# Patient Record
Sex: FEMALE | Race: BLACK OR AFRICAN AMERICAN | Employment: FULL TIME | URBAN - METROPOLITAN AREA
[De-identification: names, ages, dates, MRNs, and addresses within clinical notes are randomized per-mention and may not be internally consistent; named-entity substitution may affect disease eponyms.]

---

## 2017-01-02 ENCOUNTER — OP HISTORICAL/CONVERTED ENCOUNTER (OUTPATIENT)
Dept: OTHER | Age: 22
End: 2017-01-02

## 2017-02-13 ENCOUNTER — IP HISTORICAL/CONVERTED ENCOUNTER (OUTPATIENT)
Dept: OTHER | Age: 22
End: 2017-02-13

## 2019-10-24 LAB — PAP SMEAR, EXTERNAL: NEGATIVE

## 2020-10-07 ENCOUNTER — OFFICE VISIT (OUTPATIENT)
Dept: ENT CLINIC | Age: 25
End: 2020-10-07
Payer: MEDICAID

## 2020-10-07 VITALS
RESPIRATION RATE: 18 BRPM | HEART RATE: 82 BPM | DIASTOLIC BLOOD PRESSURE: 72 MMHG | OXYGEN SATURATION: 98 % | WEIGHT: 136 LBS | HEIGHT: 64 IN | BODY MASS INDEX: 23.22 KG/M2 | SYSTOLIC BLOOD PRESSURE: 110 MMHG | TEMPERATURE: 98.6 F

## 2020-10-07 DIAGNOSIS — J31.0 CHRONIC RHINITIS: ICD-10-CM

## 2020-10-07 DIAGNOSIS — R49.0 DYSPHONIA: ICD-10-CM

## 2020-10-07 DIAGNOSIS — J37.0 CHRONIC LARYNGITIS: Primary | ICD-10-CM

## 2020-10-07 PROCEDURE — 99204 OFFICE O/P NEW MOD 45 MIN: CPT | Performed by: OTOLARYNGOLOGY

## 2020-10-07 PROCEDURE — 31575 DIAGNOSTIC LARYNGOSCOPY: CPT | Performed by: OTOLARYNGOLOGY

## 2020-10-07 RX ORDER — MONTELUKAST SODIUM 10 MG/1
10 TABLET ORAL DAILY
Qty: 30 TAB | Refills: 3 | Status: SHIPPED | OUTPATIENT
Start: 2020-10-07 | End: 2020-11-18

## 2020-10-07 RX ORDER — FLUTICASONE PROPIONATE 50 MCG
2 SPRAY, SUSPENSION (ML) NASAL DAILY
Qty: 1 BOTTLE | Refills: 3 | Status: SHIPPED | OUTPATIENT
Start: 2020-10-07 | End: 2020-11-18

## 2020-10-07 NOTE — PROGRESS NOTES
Subjective:    Jonas Forte   25 y.o.   1995     HPI     Location - throat,, voice    Quality - hoarseness, dry mouth, throat    Severity -  Moderate/severe    Duration - approx 6 mos    Timing - chronic, ongoing    Context - pt with approx 6 mos of hoarseness, dry mouth/throat, itchy throat, some allergy congestion but no drainage; she does have to speak loudly @ work @ [employer]    Modifying Features - worse after work    Associated symptoms/signs - migraines      Review of Systems  Review of Systems   Constitutional: Negative for chills and fever. HENT: Positive for sore throat and tinnitus. Negative for ear pain, hearing loss and nosebleeds. Eyes: Negative for blurred vision and double vision. Respiratory: Positive for sputum production. Negative for cough and shortness of breath. Cardiovascular: Negative for chest pain and palpitations. Gastrointestinal: Negative for heartburn, nausea and vomiting. Musculoskeletal: Negative for joint pain and neck pain. Skin: Positive for itching. Neurological: Positive for headaches. Negative for dizziness, speech change and weakness. Endo/Heme/Allergies: Positive for environmental allergies. Does not bruise/bleed easily. Psychiatric/Behavioral: Negative for memory loss. The patient does not have insomnia. Past Medical History:   Diagnosis Date    Migraines      Past Surgical History:   Procedure Laterality Date    HX TONSILLECTOMY        History reviewed. No pertinent family history. Social History     Tobacco Use    Smoking status: Never Smoker    Smokeless tobacco: Never Used   Substance Use Topics    Alcohol use: Not on file      Prior to Admission medications    Medication Sig Start Date End Date Taking? Authorizing Provider   fluticasone propionate (FLONASE) 50 mcg/actuation nasal spray 2 Sprays by Both Nostrils route daily. 10/7/20  Yes Ian Reardon MD   montelukast (SINGULAIR) 10 mg tablet Take 1 Tab by mouth daily. 10/7/20  Yes Gray Jurado MD        No Known Allergies      Objective:     Visit Vitals  /72 (BP 1 Location: Left arm, BP Patient Position: Sitting)   Pulse 82   Temp 98.6 °F (37 °C) (Oral)   Resp 18   Ht 5' 4\" (1.626 m)   Wt 136 lb (61.7 kg)   SpO2 98%   BMI 23.34 kg/m²        Physical Exam  Vitals signs reviewed. Constitutional:       General: She is awake. She is not in acute distress. Appearance: Normal appearance. She is well-groomed and normal weight. HENT:      Head: Normocephalic and atraumatic. Jaw: There is normal jaw occlusion. No trismus, tenderness or malocclusion. Salivary Glands: Right salivary gland is not diffusely enlarged or tender. Left salivary gland is not diffusely enlarged or tender. Right Ear: Hearing, tympanic membrane, ear canal and external ear normal.      Left Ear: Hearing, tympanic membrane, ear canal and external ear normal.      Ears:      Arora exam findings: does not lateralize. Right Rinne: AC > BC. Left Rinne: AC > BC. Nose: No nasal deformity, septal deviation or mucosal edema. Right Nostril: No epistaxis. Left Nostril: No epistaxis. Right Turbinates: Not enlarged, swollen or pale. Left Turbinates: Not enlarged, swollen or pale. Right Sinus: No maxillary sinus tenderness or frontal sinus tenderness. Left Sinus: No maxillary sinus tenderness or frontal sinus tenderness. Mouth/Throat:      Lips: Pink. No lesions. Mouth: Mucous membranes are moist. No oral lesions. Dentition: Normal dentition. No dental caries. Tongue: No lesions. Palate: No mass and lesions. Pharynx: Oropharynx is clear. Uvula midline. No oropharyngeal exudate or posterior oropharyngeal erythema. Tonsils: No tonsillar exudate. 0 on the right. 0 on the left. Eyes:      General: Vision grossly intact. Extraocular Movements: Extraocular movements intact. Right eye: No nystagmus.       Left eye: No nystagmus. Conjunctiva/sclera: Conjunctivae normal.      Pupils: Pupils are equal, round, and reactive to light. Neck:      Musculoskeletal: No edema or erythema. Thyroid: No thyroid mass, thyromegaly or thyroid tenderness. Trachea: Trachea and phonation normal. No tracheal tenderness or tracheal deviation. Cardiovascular:      Rate and Rhythm: Normal rate and regular rhythm. Pulmonary:      Effort: Pulmonary effort is normal. No respiratory distress. Breath sounds: No stridor. Musculoskeletal: Normal range of motion. General: No swelling or tenderness. Lymphadenopathy:      Cervical: No cervical adenopathy. Skin:     General: Skin is warm and dry. Findings: No lesion or rash. Neurological:      General: No focal deficit present. Mental Status: She is alert and oriented to person, place, and time. Mental status is at baseline. Cranial Nerves: Cranial nerves are intact. Coordination: Romberg sign negative. Gait: Gait is intact. Comments: Negative Hallpike   Psychiatric:         Mood and Affect: Mood normal.         Behavior: Behavior normal. Behavior is cooperative. Procedure Note - Fiberoptic Laryngoscopy    The nostril is packed with lidocaine/oxymetazoline cottonball for several minutes for topical anesthesia. After several minutes the packing is removed and fiberoptic scope is advanced. Findings are as summarized. Nose - normal turbinates, septum  Nasopharynx - scar band from right torus tubarius to adenoid fossa  Oropharynx - normal tonsils, tongue base and posterior wall  Hypopharynx - normal pyriform sinus and post-cricoid region  Larynx - mild VF edema, thickened mucus along medial surface, mild nodularity  Subglottis - visualized upper trachea is normal    Assessment/Plan:     Encounter Diagnoses   Name Primary?     Chronic laryngitis Yes    Chronic rhinitis     Dysphonia      Pt with chronic laryngitis and mild nodularity secondary to voice use, allergy  Stressed importance of vocal hygiene, hydration, decrease voice overuse and take her allergy medication  She understands and will fu 2 mos    Orders Placed This Encounter    LARYNGOSCOPY,FLEX FIBER,DIAGNOSTIC    fluticasone propionate (FLONASE) 50 mcg/actuation nasal spray    montelukast (SINGULAIR) 10 mg tablet     Follow-up and Dispositions    · Return in about 2 months (around 12/7/2020).

## 2020-10-07 NOTE — LETTER
10/7/20 Patient: Skyler Miranda YOB: 1995 Date of Visit: 10/7/2020 Kaylee Issa PA-C 
[address] [place] [address] VIA Facsimile: [phone] Dear Kaylee Issa PA-C, Thank you for referring Ms. Skyler Miranda to [address] [place] [address], NOSE, THROAT AND ALLERGY [facility] for evaluation. My notes for this consultation are attached. If you have questions, please do not hesitate to call me. I look forward to following your patient along with you. Sincerely, Lilia Najera MD

## 2020-10-08 ENCOUNTER — TELEPHONE (OUTPATIENT)
Dept: ENT CLINIC | Age: 25
End: 2020-10-08

## 2020-10-26 VITALS
WEIGHT: 135 LBS | BODY MASS INDEX: 23.05 KG/M2 | HEIGHT: 64 IN | DIASTOLIC BLOOD PRESSURE: 78 MMHG | SYSTOLIC BLOOD PRESSURE: 118 MMHG

## 2020-10-26 PROBLEM — O03.9 MISCARRIAGE: Status: ACTIVE | Noted: 2020-10-26

## 2020-10-26 PROBLEM — G40.909 SEIZURE DISORDER (HCC): Status: ACTIVE | Noted: 2020-10-26

## 2020-10-26 PROBLEM — Z83.3 FAMILY HISTORY OF DIABETES MELLITUS: Status: ACTIVE | Noted: 2020-10-26

## 2020-10-26 PROBLEM — N91.2 AMENORRHEA: Status: ACTIVE | Noted: 2020-10-26

## 2020-10-26 RX ORDER — LORATADINE 10 MG/1
10 TABLET ORAL
COMMUNITY
End: 2022-01-04

## 2020-10-26 RX ORDER — PHENAZOPYRIDINE HYDROCHLORIDE 200 MG/1
TABLET, FILM COATED ORAL
COMMUNITY
End: 2022-01-04

## 2020-10-26 RX ORDER — CIPROFLOXACIN 500 MG/1
TABLET ORAL 2 TIMES DAILY
COMMUNITY
End: 2022-01-04

## 2020-10-26 RX ORDER — IBUPROFEN 800 MG/1
TABLET ORAL
COMMUNITY
End: 2022-01-04

## 2020-10-26 RX ORDER — MEDROXYPROGESTERONE ACETATE 150 MG/ML
150 INJECTION, SUSPENSION INTRAMUSCULAR ONCE
COMMUNITY
End: 2020-11-17 | Stop reason: SDUPTHER

## 2020-10-26 RX ORDER — FLUCONAZOLE 150 MG/1
150 TABLET ORAL DAILY
COMMUNITY
End: 2022-01-04

## 2020-10-26 RX ORDER — AZITHROMYCIN 250 MG/1
250 TABLET, FILM COATED ORAL DAILY
COMMUNITY
End: 2022-01-04

## 2020-10-27 ENCOUNTER — OFFICE VISIT (OUTPATIENT)
Dept: OBGYN CLINIC | Age: 25
End: 2020-10-27
Payer: MEDICAID

## 2020-10-27 VITALS — WEIGHT: 134 LBS | DIASTOLIC BLOOD PRESSURE: 68 MMHG | SYSTOLIC BLOOD PRESSURE: 104 MMHG | BODY MASS INDEX: 23 KG/M2

## 2020-10-27 DIAGNOSIS — Z01.419 GYNECOLOGIC EXAM NORMAL: ICD-10-CM

## 2020-10-27 DIAGNOSIS — N76.0 VAGINITIS AND VULVOVAGINITIS: ICD-10-CM

## 2020-10-27 DIAGNOSIS — Z12.4 ENCOUNTER FOR SCREENING FOR MALIGNANT NEOPLASM OF CERVIX: Primary | ICD-10-CM

## 2020-10-27 PROCEDURE — 99395 PREV VISIT EST AGE 18-39: CPT | Performed by: OBSTETRICS & GYNECOLOGY

## 2020-10-27 RX ORDER — FLUCONAZOLE 150 MG/1
150 TABLET ORAL DAILY
Qty: 1 TAB | Refills: 0 | Status: SHIPPED | OUTPATIENT
Start: 2020-10-27 | End: 2020-10-28

## 2020-10-27 NOTE — PROGRESS NOTES
Sonya Rubin is a 26 y.o. female, , No LMP recorded. Patient has had an injection. , who presents today for the following:  Chief Complaint   Patient presents with    Annual Exam        No Known Allergies    Current Outpatient Medications   Medication Sig    fluconazole (DIFLUCAN) 150 mg tablet Take 1 Tab by mouth daily for 1 day. FDA advises cautious prescribing of oral fluconazole in pregnancy.  medroxyPROGESTERone (Depo-Provera) 150 mg/mL syrg 150 mg by IntraMUSCular route once.  fluconazole (DIFLUCAN) 150 mg tablet Take 150 mg by mouth daily. FDA advises cautious prescribing of oral fluconazole in pregnancy.  azithromycin (ZITHROMAX) 250 mg tablet Take 250 mg by mouth daily.  ciprofloxacin HCl (CIPRO) 500 mg tablet Take  by mouth two (2) times a day.  ibuprofen (MOTRIN) 800 mg tablet Take  by mouth.  loratadine (CLARITIN) 10 mg tablet Take 10 mg by mouth.  phenazopyridine (PYRIDIUM) 200 mg tablet Take  by mouth three (3) times daily as needed for Pain.  fluticasone propionate (FLONASE) 50 mcg/actuation nasal spray 2 Sprays by Both Nostrils route daily.  montelukast (SINGULAIR) 10 mg tablet Take 1 Tab by mouth daily. No current facility-administered medications for this visit.         Past Medical History:   Diagnosis Date    Amenorrhea 10/26/2020    Family history of diabetes mellitus 10/26/2020    Migraines     Miscarriage 10/26/2020    Seizure disorder ([facility] [address] 10/26/2020       Past Surgical History:   Procedure Laterality Date    HX  SECTION      HX TONSILLECTOMY         Family History   Problem Relation Age of Onset    Diabetes Maternal Grandmother        Social History     Socioeconomic History    Marital status: SINGLE     Spouse name: Not on file    Number of children: Not on file    Years of education: Not on file    Highest education level: Not on file   Occupational History    Not on file   Social Needs    Financial resource strain: Not on file  Food insecurity     Worry: Not on file     Inability: Not on file    Transportation needs     Medical: Not on file     Non-medical: Not on file   Tobacco Use    Smoking status: Never Smoker    Smokeless tobacco: Never Used   Substance and Sexual Activity    Alcohol use: Never     Frequency: Never    Drug use: Never    Sexual activity: Yes     Partners: Male     Birth control/protection: None   Lifestyle    Physical activity     Days per week: Not on file     Minutes per session: Not on file    Stress: Not on file   Relationships    Social connections     Talks on phone: Not on file     Gets together: Not on file     Attends Sikh service: Not on file     Active member of club or organization: Not on file     Attends meetings of clubs or organizations: Not on file     Relationship status: Not on file    Intimate partner violence     Fear of current or ex partner: Not on file     Emotionally abused: Not on file     Physically abused: Not on file     Forced sexual activity: Not on file   Other Topics Concern    Not on file   Social History Narrative    Not on file         HPI  Annual    Review of Systems   Constitutional: Negative. Respiratory: Negative. Cardiovascular: Negative. Gastrointestinal: Negative. Genitourinary: Negative. Musculoskeletal: Negative. Skin: Negative. Neurological: Negative. Endo/Heme/Allergies: Negative. Psychiatric/Behavioral: Negative. All other systems reviewed and are negative. /68 (BP 1 Location: Left arm)   Wt 134 lb (60.8 kg)   BMI 23.00 kg/m²    OBGyn Exam     PE:  Constitutional: General Appearance: healthy-appearing, well-nourished, well-developed, and well groomed. Psychiatric: Orientation: to time, place, and person. Mood and Affect: normal mood and affect and appropriate and active and alert. Breast: no masses no retraction no tenderness    Abdomen:  Auscultation/Inspection/Palpation: tenderness and mass and non-distended. Hernia: none palpated. Female Genitalia: Vulva: no masses, atrophy, or lesions. Bladder/Urethra: no urethral discharge or mass and normal meatus and bladder non distended. Vagina no tenderness, erythema, cystocele, rectocele, abnormal vaginal discharge, or vesicle(s) or ulcers. Cervix: no discharge or cervical motion tenderness and grossly normal.     Uterus: mobile, non-tender, and no uterine prolapse. Adnexa/Parametria: no adnexal tenderness. 1. Gynecologic exam normal    - PAP IG, CT-NG, RFX APTIMA HPV ASCUS ([id number], [id number]))    2. Encounter for screening for malignant neoplasm of cervix      3. Vaginitis and vulvovaginitis    - fluconazole (DIFLUCAN) 150 mg tablet; Take 1 Tab by mouth daily for 1 day. FDA advises cautious prescribing of oral fluconazole in pregnancy. Dispense: 1 Tab;  Refill: 0        Follow-up and Dispositions    · Return in about 1 year (around 10/27/2021) for annual.

## 2020-10-27 NOTE — PATIENT INSTRUCTIONS
Vaginal Yeast Infection: Care Instructions Your Care Instructions A vaginal yeast infection is caused by too many yeast cells in the vagina. This is common in women of all ages. Itching, vaginal discharge and irritation, and other symptoms can bother you. But yeast infections don't often cause other health problems. Some medicines can increase your risk of getting a yeast infection. These include antibiotics, birth control pills, hormones, and steroids. You may also be more likely to get a yeast infection if you are pregnant, have diabetes, douche, or wear tight clothes. With treatment, most yeast infections get better in 2 to 3 days. Follow-up care is a key part of your treatment and safety. Be sure to make and go to all appointments, and call your doctor if you are having problems. It's also a good idea to know your test results and keep a list of the medicines you take. How can you care for yourself at home? · Take your medicines exactly as prescribed. Call your doctor if you think you are having a problem with your medicine. · Ask your doctor about over-the-counter (OTC) medicines for yeast infections. They may cost less than prescription medicines. If you use an OTC treatment, read and follow all instructions on the label. · Do not use tampons while using a vaginal cream or suppository. The tampons can absorb the medicine. Use pads instead. · Wear loose cotton clothing. Do not wear nylon or other fabric that holds body heat and moisture close to the skin. · Try sleeping without underwear. · Do not scratch. Relieve itching with a cold pack or a cool bath. · Do not wash your vaginal area more than once a day. Use plain water or a mild, unscented soap. Air-dry the vaginal area. · Change out of wet swimsuits after swimming. · Do not have sex until you have finished your treatment. · Do not douche. When should you call for help? Call your doctor now or seek immediate medical care if:   · You have unexpected vaginal bleeding.  
  · You have new or increased pain in your vagina or pelvis. Watch closely for changes in your health, and be sure to contact your doctor if: 
  · You have a fever.  
  · You are not getting better after 2 days.  
  · Your symptoms come back after you finish your medicines. Where can you learn more? Go to http://www.gray.com/ Enter J802 in the search box to learn more about \"Vaginal Yeast Infection: Care Instructions. \" Current as of: November 8, 2019               Content Version: 12.6 © 1448-2697 [employer]. Care instructions adapted under license by [employer] (which disclaims liability or warranty for this information). If you have questions about a medical condition or this instruction, always ask your healthcare professional. [address] any warranty or liability for your use of this information. Breast Self-Exam: Care Instructions Your Care Instructions A breast self-exam is when you check your breasts for lumps or changes. This regular exam helps you learn how your breasts normally look and feel. Most breast problems or changes are not because of cancer. Breast self-exam is not a substitute for a mammogram. Having regular breast exams by your doctor and regular mammograms improve your chances of finding any problems with your breasts. Some women set a time each month to do a step-by-step breast self-exam. Other women like a less formal system. They might look at their breasts as they brush their teeth, or feel their breasts once in a while in the shower. If you notice a change in your breast, tell your doctor. Follow-up care is a key part of your treatment and safety. Be sure to make and go to all appointments, and call your doctor if you are having problems. It's also a good idea to know your test results and keep a list of the medicines you take. How do you do a breast self-exam? 
· The best time to examine your breasts is usually one week after your menstrual period begins. Your breasts should not be tender then. If you do not have periods, you might do your exam on a day of the month that is easy to remember. · To examine your breasts: ? Remove all your clothes above the waist and lie down. When you are lying down, your breast tissue spreads evenly over your chest wall, which makes it easier to feel all your breast tissue. ? Use the padsnot the fingertipsof the 3 middle fingers of your left hand to check your right breast. Move your fingers slowly in small coin-sized circles that overlap. ? Use three levels of pressure to feel of all your breast tissue. Use light pressure to feel the tissue close to the skin surface. Use medium pressure to feel a little deeper. Use firm pressure to feel your tissue close to your breastbone and ribs. Use each pressure level to feel your breast tissue before moving on to the next spot. ? Check your entire breast, moving up and down as if following a strip from the collarbone to the bra line, and from the armpit to the ribs. Repeat until you have covered the entire breast. 
? Repeat this procedure for your left breast, using the pads of the 3 middle fingers of your right hand. · To examine your breasts while in the shower: 
? Place one arm over your head and lightly soap your breast on that side. ? Using the pads of your fingers, gently move your hand over your breast (in the strip pattern described above), feeling carefully for any lumps or changes. ? Repeat for the other breast. 
· Have your doctor inspect anything you notice to see if you need further testing. Where can you learn more? Go to http://www.gray.com/ Enter P148 in the search box to learn more about \"Breast Self-Exam: Care Instructions. \" Current as of: April 29, 2020               Content Version: 12.6 © 2351-4609 [employer]. Care instructions adapted under license by [employer] (which disclaims liability or warranty for this information). If you have questions about a medical condition or this instruction, always ask your healthcare professional. [address] any warranty or liability for your use of this information.

## 2020-11-02 LAB
C TRACH RRNA CVX QL NAA+PROBE: NEGATIVE
CYTOLOGIST CVX/VAG CYTO: NORMAL
CYTOLOGY CVX/VAG DOC CYTO: NORMAL
CYTOLOGY CVX/VAG DOC THIN PREP: NORMAL
DX ICD CODE: NORMAL
LABCORP, 190119: NORMAL
Lab: NORMAL
N GONORRHOEA RRNA CVX QL NAA+PROBE: NEGATIVE
OTHER STN SPEC: NORMAL
STAT OF ADQ CVX/VAG CYTO-IMP: NORMAL

## 2020-11-17 DIAGNOSIS — N94.89 SUPPRESSION OF MENSES: Primary | ICD-10-CM

## 2020-11-17 RX ORDER — MEDROXYPROGESTERONE ACETATE 150 MG/ML
INJECTION, SUSPENSION INTRAMUSCULAR
Qty: 1 SYRINGE | Refills: 3 | Status: SHIPPED | OUTPATIENT
Start: 2020-11-17 | End: 2021-09-23

## 2020-11-18 RX ORDER — MONTELUKAST SODIUM 10 MG/1
TABLET ORAL
Qty: 90 TAB | Refills: 1 | OUTPATIENT
Start: 2020-11-18 | End: 2022-01-04

## 2020-11-18 RX ORDER — FLUTICASONE PROPIONATE 50 MCG
SPRAY, SUSPENSION (ML) NASAL
Qty: 1 BOTTLE | Refills: 1 | OUTPATIENT
Start: 2020-11-18 | End: 2022-01-04

## 2021-04-14 ENCOUNTER — APPOINTMENT (OUTPATIENT)
Dept: GENERAL RADIOLOGY | Age: 26
End: 2021-04-14
Attending: NURSE PRACTITIONER
Payer: MEDICAID

## 2021-04-14 ENCOUNTER — HOSPITAL ENCOUNTER (EMERGENCY)
Age: 26
Discharge: HOME | End: 2021-04-14
Payer: MEDICAID

## 2021-04-14 VITALS
WEIGHT: 130 LBS | BODY MASS INDEX: 22.2 KG/M2 | HEART RATE: 78 BPM | OXYGEN SATURATION: 99 % | TEMPERATURE: 100.4 F | DIASTOLIC BLOOD PRESSURE: 79 MMHG | SYSTOLIC BLOOD PRESSURE: 112 MMHG | HEIGHT: 64 IN | RESPIRATION RATE: 20 BRPM

## 2021-04-14 DIAGNOSIS — B34.9 VIRAL ILLNESS: Primary | ICD-10-CM

## 2021-04-14 DIAGNOSIS — Z20.822 SUSPECTED COVID-19 VIRUS INFECTION: ICD-10-CM

## 2021-04-14 PROCEDURE — 71045 X-RAY EXAM CHEST 1 VIEW: CPT

## 2021-04-14 PROCEDURE — U0003 INFECTIOUS AGENT DETECTION BY NUCLEIC ACID (DNA OR RNA); SEVERE ACUTE RESPIRATORY SYNDROME CORONAVIRUS 2 (SARS-COV-2) (CORONAVIRUS DISEASE [COVID-19]), AMPLIFIED PROBE TECHNIQUE, MAKING USE OF HIGH THROUGHPUT TECHNOLOGIES AS DESCRIBED BY CMS-2020-01-R: HCPCS

## 2021-04-14 PROCEDURE — 74011250637 HC RX REV CODE- 250/637: Performed by: NURSE PRACTITIONER

## 2021-04-14 PROCEDURE — 99283 EMERGENCY DEPT VISIT LOW MDM: CPT

## 2021-04-14 RX ORDER — IBUPROFEN 600 MG/1
600 TABLET ORAL
Status: COMPLETED | OUTPATIENT
Start: 2021-04-14 | End: 2021-04-14

## 2021-04-14 RX ADMIN — IBUPROFEN 600 MG: 600 TABLET ORAL at 19:31

## 2021-04-14 NOTE — Clinical Note
[address] [place] [address] EMERGENCY DEPT 
[facility] [address] [place] [id number] 
[phone] Work/School Note Date: 4/14/2021 To Whom It May concern: 
 
Vladimir Jung was evaulated by the following provider(s): 
Nurse Practitioner: Presley Laws NP.   COVID19 virus is suspected. Per the CDC guidelines we recommend home isolation until the following conditions are all met: 1. At least 10 days have passed since symptoms first appeared and 2. At least 24 hours have passed since last fever without the use of fever-reducing medications and 
3. Symptoms (e.g., cough, shortness of breath) have improved Sincerely, Andrew Mancini NP

## 2021-04-15 LAB — SARS-COV-2, COV2: NORMAL

## 2021-04-15 NOTE — DISCHARGE INSTRUCTIONS
Thank you! Thank you for allowing me to care for you in the emergency department. I sincerely hope that you are satisfied with your visit today. It is my goal to provide you with excellent care. Below you will find a list of your labs and imaging from your visit today. Should you have any questions regarding these results please do not hesitate to call the emergency department. Labs -   No results found for this or any previous visit (from the past 12 hour(s)). Radiologic Studies -   XR CHEST PORT   Final Result   This is a normal single view chest radiograph. Your COVID-19 test is pending. Treat your symptoms - tylenol 500mg every 4hrs as Needed or Ibuprofen 600mg every 6hrs as needed for fever or pain. You need to self quarantine. Stay hydrated! Take your allergy medications regularly. If you feel that you have not received excellent quality care or timely care, please ask to speak to the nurse manager. Please choose us in the future for your continued health care needs. ------------------------------------------------------------------------------------------------------------  The exam and treatment you received in the Emergency Department were for an urgent problem and are not intended as complete care. It is important that you follow-up with a doctor, nurse practitioner, or physician assistant to:  (1) confirm your diagnosis,  (2) re-evaluation of changes in your illness and treatment, and  (3) for ongoing care. If your symptoms become worse or you do not improve as expected and you are unable to reach your usual health care provider, you should return to the Emergency Department. We are available 24 hours a day. Please take your discharge instructions with you when you go to your follow-up appointment. If you have any problem arranging a follow-up appointment, contact the Emergency Department immediately.     If a prescription has been provided, please have it filled as soon as possible to prevent a delay in treatment. Read the entire medication instruction sheet provided to you by the pharmacy. If you have any questions or reservations about taking the medication due to side effects or interactions with other medications, please call your primary care physician or contact the ER to speak with the charge nurse. Make an appointment with your family doctor or the physician you were referred to for follow-up of this visit as instructed on your discharge paperwork, as this is a mandatory follow-up. Return to the ER if you are unable to be seen or if you are unable to be seen in a timely manner. If you have any problem arranging the follow-up visit, contact the Emergency Department immediately.

## 2021-04-15 NOTE — ED PROVIDER NOTES
EMERGENCY DEPARTMENT HISTORY AND PHYSICAL EXAM      Date: 4/14/2021  Patient Name: Ramos Torre    History of Presenting Illness     Chief Complaint   Patient presents with    Generalized Body Aches    Nasal Congestion       History Provided By: Patient    HPI: Ramos Torre, 26 y.o. female with a past medical history significant seasonal allergies presents to the ED with cc of headache, body aches, rhinorrhea, dry cough and low-grade temperature x2 days. States she has been taking [place] plus with no improvement. She denies any associated shortness of breath, wheezing, dizziness, palpitations or visual changes. States she has been eating and drinking without problems. She denies any known sick contacts. There are no other complaints, changes, or physical findings at this time. PCP: Rose Mir NP    No current facility-administered medications on file prior to encounter. Current Outpatient Medications on File Prior to Encounter   Medication Sig Dispense Refill    fluticasone propionate (FLONASE) 50 mcg/actuation nasal spray SPRAY 2 SPRAYS INTO EACH NOSTRIL DAILY 1 Bottle 1    montelukast (SINGULAIR) 10 mg tablet TAKE 1 TABLET BY MOUTH EVERY DAY 90 Tab 1    medroxyPROGESTERone (Depo-Provera) 150 mg/mL syrg Inject 1 ml IM every 3 months. 1 Syringe 3    fluconazole (DIFLUCAN) 150 mg tablet Take 150 mg by mouth daily. FDA advises cautious prescribing of oral fluconazole in pregnancy.  azithromycin (ZITHROMAX) 250 mg tablet Take 250 mg by mouth daily.  ciprofloxacin HCl (CIPRO) 500 mg tablet Take  by mouth two (2) times a day.  ibuprofen (MOTRIN) 800 mg tablet Take  by mouth.  loratadine (CLARITIN) 10 mg tablet Take 10 mg by mouth.  phenazopyridine (PYRIDIUM) 200 mg tablet Take  by mouth three (3) times daily as needed for Pain.          Past History     Past Medical History:  Past Medical History:   Diagnosis Date    Amenorrhea 10/26/2020    Family history of diabetes mellitus 10/26/2020    Migraines     Miscarriage 10/26/2020    Seizure disorder ([address] 10/26/2020       Past Surgical History:  Past Surgical History:   Procedure Laterality Date    HX  SECTION      HX TONSILLECTOMY         Family History:  Family History   Problem Relation Age of Onset    Diabetes Maternal Grandmother        Social History:  Social History     Tobacco Use    Smoking status: Never Smoker    Smokeless tobacco: Never Used   Substance Use Topics    Alcohol use: Never     Frequency: Never    Drug use: Never       Allergies:  No Known Allergies      Review of Systems     Review of Systems   Constitutional: Positive for fever. Negative for appetite change. HENT: Positive for rhinorrhea. Eyes: Negative for visual disturbance. Respiratory: Positive for cough. Negative for shortness of breath and wheezing. Cardiovascular: Negative for palpitations. Musculoskeletal: Positive for myalgias. Neurological: Positive for headaches. Negative for dizziness. All other systems reviewed and are negative. Physical Exam     Physical Exam  Vitals signs and nursing note reviewed. Constitutional:       General: She is not in acute distress. Appearance: Normal appearance. HENT:      Head: Normocephalic and atraumatic. Right Ear: Tympanic membrane normal.      Left Ear: Tympanic membrane normal.      Nose: Nose normal.      Mouth/Throat:      Lips: Pink. Mouth: Mucous membranes are moist.      Pharynx: Oropharynx is clear. Uvula midline. Eyes:      Extraocular Movements: Extraocular movements intact. Conjunctiva/sclera: Conjunctivae normal.   Cardiovascular:      Rate and Rhythm: Normal rate and regular rhythm. Heart sounds: Normal heart sounds. Pulmonary:      Effort: Pulmonary effort is normal.      Breath sounds: Normal breath sounds. No wheezing or rales. Musculoskeletal: Normal range of motion.    Lymphadenopathy:      Cervical: No cervical adenopathy. Skin:     General: Skin is warm and dry. Neurological:      General: No focal deficit present. Mental Status: She is alert. Psychiatric:         Mood and Affect: Mood normal.         Behavior: Behavior normal. Behavior is cooperative. Lab and Diagnostic Study Results     Labs -   No results found for this or any previous visit (from the past 12 hour(s)). Radiologic Studies -   @lastxrresult@  CT Results  (Last 48 hours)    None        CXR Results  (Last 48 hours)               04/14/21 1930  XR CHEST PORT Final result    Impression: This is a normal single view chest radiograph. Narrative: The study is a single view chest radiographic examination dated 4/14/2021. HISTORY: A cough. COMPARISON:  None       FINDINGS: The heart size is normal. The lungs are clear without an infiltrate or   an effusion. The bony thorax is intact without acute osseous abnormalities. There is a midline position to the trachea. The pulmonary harris are symmetrical.                   Medical Decision Making   - I am the first provider for this patient. - I reviewed the vital signs, available nursing notes, past medical history, past surgical history, family history and social history. - Initial assessment performed. The patients presenting problems have been discussed, and they are in agreement with the care plan formulated and outlined with them. I have encouraged them to ask questions as they arise throughout their visit. Vital Signs-Reviewed the patient's vital signs. Patient Vitals for the past 12 hrs:   Temp Pulse Resp BP SpO2   04/14/21 1945  78  112/79    04/14/21 1834 100.4 °F (38 °C) 82 20  99 %       Records Reviewed: Nursing Notes        ED Course:   Patient presents with 2-day history of cough, malaise. CXR negative for pneumonia. VS stableno hypoxia or tachycardia, low-grade temp 100.4. Patient was treated with IBU with improvement of symptoms.   Covid pending. Reinforced importance of self isolation and symptomatic treatment. Discussed worrisome reasons to return to the department including worsening symptoms or dyspnea. Provider Notes (Medical Decision Making):     MDM  Number of Diagnoses or Management Options  Suspected COVID-19 virus infection: minor  Viral illness: minor     Amount and/or Complexity of Data Reviewed  Clinical lab tests: ordered  Tests in the radiology section of CPT®: ordered and reviewed  Independent visualization of images, tracings, or specimens: yes    Risk of Complications, Morbidity, and/or Mortality  Presenting problems: minimal  Diagnostic procedures: minimal  Management options: minimal    Patient Progress  Patient progress: stable         Disposition   Disposition: Condition stable  DC-The patient was given verbal fever treatment  and follow-up instructions  DC- Pain Control DC Home plan: Nonsteroidals, Tylenol and Referral Family Medicine/PCP    Discharged    DISCHARGE PLAN:  1. Cannot display discharge medications since this patient is not currently admitted. 2.   Follow-up Information     Follow up With Specialties Details Why Contact Info    Alen Bernard NP Nurse Practitioner Schedule an appointment as soon as possible for a visit  for ER follow up [address]      [address] [place] [address] DEPT Emergency Medicine  If symptoms worsen [facility]  [phone]        3. Return to ED if worse   4. Discharge Medication List as of 4/14/2021  8:36 PM            Diagnosis     Clinical Impression:   1. Viral illness    2. Suspected COVID-19 virus infection        Attestations:    Ashok Campbell NP    Please note that this dictation was completed with [employer], the computer voice recognition software. Quite often unanticipated grammatical, syntax, homophones, and other interpretive errors are inadvertently transcribed by the computer software.   Please disregard these errors. Please excuse any errors that have escaped final proofreading. Thank you.

## 2021-04-16 LAB — SARS-COV-2, COV2NT: NOT DETECTED

## 2021-04-27 ENCOUNTER — HOSPITAL ENCOUNTER (EMERGENCY)
Age: 26
Discharge: HOME | End: 2021-04-27
Admitting: STUDENT IN AN ORGANIZED HEALTH CARE EDUCATION/TRAINING PROGRAM
Payer: MEDICAID

## 2021-04-27 VITALS
DIASTOLIC BLOOD PRESSURE: 80 MMHG | RESPIRATION RATE: 20 BRPM | HEART RATE: 69 BPM | WEIGHT: 146 LBS | TEMPERATURE: 98.2 F | HEIGHT: 64 IN | BODY MASS INDEX: 24.92 KG/M2 | SYSTOLIC BLOOD PRESSURE: 122 MMHG | OXYGEN SATURATION: 100 %

## 2021-04-27 DIAGNOSIS — S70.362A INSECT BITE OF LEFT THIGH, INITIAL ENCOUNTER: ICD-10-CM

## 2021-04-27 DIAGNOSIS — J30.9 ALLERGIC RHINITIS, UNSPECIFIED SEASONALITY, UNSPECIFIED TRIGGER: ICD-10-CM

## 2021-04-27 DIAGNOSIS — R11.11 NON-INTRACTABLE VOMITING WITHOUT NAUSEA, UNSPECIFIED VOMITING TYPE: Primary | ICD-10-CM
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
## 2021-04-27 LAB
APPEARANCE UR: CLEAR
BACTERIA URNS QL MICRO: NEGATIVE /HPF
BILIRUB UR QL: NEGATIVE
COLOR UR: NORMAL
GLUCOSE UR STRIP.AUTO-MCNC: NEGATIVE MG/DL
HCG UR QL: NEGATIVE
HGB UR QL STRIP: NEGATIVE
KETONES UR QL STRIP.AUTO: NEGATIVE MG/DL
LEUKOCYTE ESTERASE UR QL STRIP.AUTO: NEGATIVE
MUCOUS THREADS URNS QL MICRO: NORMAL /LPF
NITRITE UR QL STRIP.AUTO: NEGATIVE
PH UR STRIP: 6 [PH] (ref 5–8)
PROT UR STRIP-MCNC: NEGATIVE MG/DL
RBC #/AREA URNS HPF: NORMAL /HPF (ref 0–5)
SP GR UR REFRACTOMETRY: 1.01 (ref 1–1.03)
UA: UC IF INDICATED,UAUC: NORMAL
UROBILINOGEN UR QL STRIP.AUTO: 0.1 EU/DL (ref 0.1–1)
WBC URNS QL MICRO: NORMAL /HPF (ref 0–4)

## 2021-04-27 PROCEDURE — 99283 EMERGENCY DEPT VISIT LOW MDM: CPT

## 2021-04-27 PROCEDURE — 81001 URINALYSIS AUTO W/SCOPE: CPT

## 2021-04-27 PROCEDURE — 81025 URINE PREGNANCY TEST: CPT

## 2021-04-27 RX ORDER — DIPHENHYDRAMINE HCL 25 MG
25 CAPSULE ORAL
Qty: 30 CAP | Refills: 0 | Status: SHIPPED | OUTPATIENT
Start: 2021-04-27 | End: 2021-05-07

## 2021-04-27 NOTE — Clinical Note
[address] [place] [address] EMERGENCY DEPT 
[facility] [address] [address] 
[phone] Work/School Note Date: 4/27/2021 To Whom It May concern: 
 
Geremias Blackman was seen and treated today in the emergency room by the following provider(s): 
Nurse Practitioner: Jabari Waters NP. Geremias Blackman is excused from work/school on 04/27/21 and 04/28/21. She is medically clear to return to work/school on 4/29/2021. Sincerely, Pau Valdivia NP

## 2021-04-27 NOTE — ED TRIAGE NOTES
Pt states she was stung by a wasp yesterday. Vomiting today x1 episode. Comes into ER drinking a smoothie and states shes been able to keep down food.

## 2021-04-28 NOTE — DISCHARGE INSTRUCTIONS
Thank you! Thank you for allowing me to care for you in the emergency department. I sincerely hope that you are satisfied with your visit today. It is my goal to provide you with excellent care. Below you will find a list of your labs and imaging from your visit today. Should you have any questions regarding these results please do not hesitate to call the emergency department. Labs -     Recent Results (from the past 12 hour(s))   URINALYSIS W/ REFLEX CULTURE    Collection Time: 04/27/21  8:20 PM    Specimen: Urine   Result Value Ref Range    Color Yellow/Straw      Appearance Clear Clear      Specific gravity 1.010 1.003 - 1.030      pH (UA) 6.0 5.0 - 8.0      Protein Negative Negative mg/dL    Glucose Negative Negative mg/dL    Ketone Negative Negative mg/dL    Bilirubin Negative Negative      Blood Negative Negative      Urobilinogen 0.1 0.1 - 1.0 EU/dL    Nitrites Negative Negative      Leukocyte Esterase Negative Negative      UA:UC IF INDICATED Culture not indicated by UA result Culture not indicated by UA result      WBC 0-4 0 - 4 /hpf    RBC 0-5 0 - 5 /hpf    Bacteria Negative Negative /hpf    Mucus Trace /lpf   HCG URINE, QL    Collection Time: 04/27/21  8:20 PM   Result Value Ref Range    HCG urine, QL Negative Negative               If you feel that you have not received excellent quality care or timely care, please ask to speak to the nurse manager. Please choose us in the future for your continued health care needs. ------------------------------------------------------------------------------------------------------------  The exam and treatment you received in the Emergency Department were for an urgent problem and are not intended as complete care. It is important that you follow-up with a doctor, nurse practitioner, or physician assistant to:  (1) confirm your diagnosis,  (2) re-evaluation of changes in your illness and treatment, and  (3) for ongoing care.   If your symptoms become worse or you do not improve as expected and you are unable to reach your usual health care provider, you should return to the Emergency Department. We are available 24 hours a day. Please take your discharge instructions with you when you go to your follow-up appointment. If you have any problem arranging a follow-up appointment, contact the Emergency Department immediately. If a prescription has been provided, please have it filled as soon as possible to prevent a delay in treatment. Read the entire medication instruction sheet provided to you by the pharmacy. If you have any questions or reservations about taking the medication due to side effects or interactions with other medications, please call your primary care physician or contact the ER to speak with the charge nurse. Make an appointment with your family doctor or the physician you were referred to for follow-up of this visit as instructed on your discharge paperwork, as this is a mandatory follow-up. Return to the ER if you are unable to be seen or if you are unable to be seen in a timely manner. If you have any problem arranging the follow-up visit, contact the Emergency Department immediately.

## 2021-05-02 NOTE — ED PROVIDER NOTES
EMERGENCY DEPARTMENT HISTORY AND PHYSICAL EXAM      Date: 4/27/2021  Patient Name: Charles Guerrero    History of Presenting Illness     Chief Complaint   Patient presents with    Vomiting       History Provided By: Patient    HPI: Charles Guerrero, 26 y.o. female with a past medical history significant for migraines presents to the ED with cc of sudden onset N/V today. She reports 3 episodes of vomiting PTA, she is currently eating pretzels and drinking a smoothie. She states she was stung by a wasp yesterday on her left thigh and thinks this may have caused her to vomit. She denies any further nausea. She denies any associated abd pain, urinary symptoms or pregnancy concerns. She also states she was coughing prior to as her allergies have been acting up and she has not been taking her meds. She denies any fever/chills, SOB, wheezing, itchy throat or trouble swallowing. She reports some tenderness and itching at the wasp sting site. She has not tried taking anything for her symptoms. LMP: depo    There are no other complaints, changes, or physical findings at this time. PCP: Jose Nicole NP    No current facility-administered medications on file prior to encounter. Current Outpatient Medications on File Prior to Encounter   Medication Sig Dispense Refill    fluticasone propionate (FLONASE) 50 mcg/actuation nasal spray SPRAY 2 SPRAYS INTO EACH NOSTRIL DAILY 1 Bottle 1    montelukast (SINGULAIR) 10 mg tablet TAKE 1 TABLET BY MOUTH EVERY DAY 90 Tab 1    medroxyPROGESTERone (Depo-Provera) 150 mg/mL syrg Inject 1 ml IM every 3 months. 1 Syringe 3    fluconazole (DIFLUCAN) 150 mg tablet Take 150 mg by mouth daily. FDA advises cautious prescribing of oral fluconazole in pregnancy.  azithromycin (ZITHROMAX) 250 mg tablet Take 250 mg by mouth daily.  ciprofloxacin HCl (CIPRO) 500 mg tablet Take  by mouth two (2) times a day.  ibuprofen (MOTRIN) 800 mg tablet Take  by mouth.       loratadine (CLARITIN) 10 mg tablet Take 10 mg by mouth.  phenazopyridine (PYRIDIUM) 200 mg tablet Take  by mouth three (3) times daily as needed for Pain. Past History     Past Medical History:  Past Medical History:   Diagnosis Date    Amenorrhea 10/26/2020    Family history of diabetes mellitus 10/26/2020    Migraines     Miscarriage 10/26/2020    Seizure disorder ([address] 10/26/2020       Past Surgical History:  Past Surgical History:   Procedure Laterality Date    HX  SECTION      HX TONSILLECTOMY         Family History:  Family History   Problem Relation Age of Onset    Diabetes Maternal Grandmother        Social History:  Social History     Tobacco Use    Smoking status: Never Smoker    Smokeless tobacco: Never Used   Substance Use Topics    Alcohol use: Never     Frequency: Never    Drug use: Never       Allergies:  No Known Allergies      Review of Systems     Review of Systems   HENT: Positive for congestion, postnasal drip, rhinorrhea, sinus pressure and sneezing. Negative for sore throat and trouble swallowing. Respiratory: Positive for cough. Negative for shortness of breath and wheezing. Gastrointestinal: Positive for nausea and vomiting. Negative for abdominal pain. Genitourinary: Negative for dysuria. Skin: Positive for wound. All other systems reviewed and are negative. Physical Exam     Physical Exam  Vitals signs and nursing note reviewed. Constitutional:       General: She is not in acute distress. Appearance: Normal appearance. HENT:      Head: Normocephalic and atraumatic. Ears:      Comments: TMs dull bilaterally     Nose: Rhinorrhea present. Rhinorrhea is clear. Right Turbinates: Swollen. Left Turbinates: Swollen. Right Sinus: Maxillary sinus tenderness present. Left Sinus: Maxillary sinus tenderness present. Mouth/Throat:      Lips: Pink. Mouth: Mucous membranes are moist.      Pharynx: Oropharynx is clear.  Uvula midline. Comments: +PND  Eyes:      Extraocular Movements: Extraocular movements intact. Conjunctiva/sclera: Conjunctivae normal.   Cardiovascular:      Rate and Rhythm: Normal rate and regular rhythm. Heart sounds: Normal heart sounds. Pulmonary:      Effort: Pulmonary effort is normal.      Breath sounds: Normal breath sounds. No wheezing or rales. Abdominal:      General: Bowel sounds are normal.      Palpations: Abdomen is soft. Tenderness: There is no abdominal tenderness. There is no right CVA tenderness or left CVA tenderness. Musculoskeletal: Normal range of motion. Skin:     General: Skin is warm and dry. Findings: Erythema present. Comments: TTP erythematous swollen area left thigh   Neurological:      General: No focal deficit present. Mental Status: She is alert. Psychiatric:         Mood and Affect: Mood normal.         Behavior: Behavior normal. Behavior is cooperative. Lab and Diagnostic Study Results     Labs -  Admission on 04/27/2021, Discharged on 04/27/2021   Component Date Value    Color 04/27/2021 Yellow/Straw     Appearance 04/27/2021 Clear     Specific gravity 04/27/2021 1.010     pH (UA) 04/27/2021 6.0     Protein 04/27/2021 Negative     Glucose 04/27/2021 Negative     Ketone 04/27/2021 Negative     Bilirubin 04/27/2021 Negative     Blood 04/27/2021 Negative     Urobilinogen 04/27/2021 0.1     Nitrites 04/27/2021 Negative     Leukocyte Esterase 04/27/2021 Negative     UA:UC IF INDICATED 04/27/2021 Culture not indicated by UA result     WBC 04/27/2021 0-4     RBC 04/27/2021 0-5     Bacteria 04/27/2021 Negative     Mucus 04/27/2021 Trace     HCG urine, QL 04/27/2021 Negative        Radiologic Studies -   @lastxrresult@  CT Results  (Last 48 hours)    None        CXR Results  (Last 48 hours)    None            Medical Decision Making   - I am the first provider for this patient.     - I reviewed the vital signs, available nursing notes, past medical history, past surgical history, family history and social history. - Initial assessment performed. The patients presenting problems have been discussed, and they are in agreement with the care plan formulated and outlined with them. I have encouraged them to ask questions as they arise throughout their visit. Vital Signs-Reviewed the patient's vital signs. See chart    Records Reviewed: Nursing Notes    The patient presents with vomiting with a differential diagnosis of gastroenteritis, pregnancy, food intolerance, gag reflex       ED Course:   Pt presents with N/V that resolved PTA, eating and drinking since arrival with no further N/V. UA/hcg normal. Pt advised to use warm compresses on left thigh with PO benadryl for allergic reaction type symptoms. Recommend she restart other allergy medications as this was likely contributing to initial symptoms. VS normal - no hypoxia, afebrile. Provider Notes (Medical Decision Making):     MDM  Number of Diagnoses or Management Options  Allergic rhinitis, unspecified seasonality, unspecified trigger: established, worsening  Insect bite of left thigh, initial encounter: new, no workup  Non-intractable vomiting without nausea, unspecified vomiting type: new, no workup     Amount and/or Complexity of Data Reviewed  Tests in the radiology section of CPT®: ordered and reviewed  Independent visualization of images, tracings, or specimens: yes    Risk of Complications, Morbidity, and/or Mortality  Presenting problems: low  Management options: low    Patient Progress  Patient progress: stable           Disposition   Disposition: Condition improved  DC-The patient was given verbal follow-up instructions  DC- Pain Control DC Home plan: Nonsteroidals, Tylenol and Referral Family Medicine/PCP    Discharged    DISCHARGE PLAN:  1. Cannot display discharge medications since this patient is not currently admitted.     2.   Follow-up Information     Follow up With Specialties Details Why Contact Info    Fabiola Han NP Nurse Practitioner In 2 days for ER follow up Catherine [address]rien [phone]      [address] [place] [address] DEPT Emergency Medicine  If symptoms worsen [address] [facility] [id number] [phone]        3. Return to ED if worse   4. Discharge Medication List as of 4/27/2021  9:33 PM      START taking these medications    Details   diphenhydrAMINE (BenadryL) 25 mg capsule Take 1 Cap by mouth every six (6) hours as needed for Itching, Skin Irritation or Allergies for up to 10 days. , Normal, Disp-30 Cap, R-0         CONTINUE these medications which have NOT CHANGED    Details   fluticasone propionate (FLONASE) 50 mcg/actuation nasal spray SPRAY 2 SPRAYS INTO EACH NOSTRIL DAILY, Normal, Disp-1 Bottle,R-1      montelukast (SINGULAIR) 10 mg tablet TAKE 1 TABLET BY MOUTH EVERY DAY, Normal, Disp-90 Tab,R-1      medroxyPROGESTERone (Depo-Provera) 150 mg/mL syrg Inject 1 ml IM every 3 months., Normal, Disp-1 Syringe,R-3      fluconazole (DIFLUCAN) 150 mg tablet Take 150 mg by mouth daily. FDA advises cautious prescribing of oral fluconazole in pregnancy. , Historical Med      azithromycin (ZITHROMAX) 250 mg tablet Take 250 mg by mouth daily. , Historical Med      ciprofloxacin HCl (CIPRO) 500 mg tablet Take  by mouth two (2) times a day., Historical Med      ibuprofen (MOTRIN) 800 mg tablet Take  by mouth., Historical Med      loratadine (CLARITIN) 10 mg tablet Take 10 mg by mouth., Historical Med      phenazopyridine (PYRIDIUM) 200 mg tablet Take  by mouth three (3) times daily as needed for Pain., Historical Med               Diagnosis     Clinical Impression:   1. Non-intractable vomiting without nausea, unspecified vomiting type    2. Allergic rhinitis, unspecified seasonality, unspecified trigger    3.  Insect bite of left thigh, initial encounter        Attestations:    Jazzy Metzger NP    Please note that this dictation was completed with [employer], the computer voice recognition software. Quite often unanticipated grammatical, syntax, homophones, and other interpretive errors are inadvertently transcribed by the computer software. Please disregard these errors. Please excuse any errors that have escaped final proofreading. Thank you.

## 2021-07-09 ENCOUNTER — HOSPITAL ENCOUNTER (EMERGENCY)
Age: 26
Discharge: HOME | End: 2021-07-09
Payer: MEDICAID

## 2021-07-09 VITALS
SYSTOLIC BLOOD PRESSURE: 125 MMHG | DIASTOLIC BLOOD PRESSURE: 77 MMHG | OXYGEN SATURATION: 100 % | BODY MASS INDEX: 23.56 KG/M2 | HEIGHT: 64 IN | TEMPERATURE: 99.5 F | RESPIRATION RATE: 18 BRPM | HEART RATE: 75 BPM | WEIGHT: 138 LBS

## 2021-07-09 DIAGNOSIS — N76.0 VAGINITIS AND VULVOVAGINITIS: ICD-10-CM

## 2021-07-09 DIAGNOSIS — Z20.2 STD EXPOSURE: ICD-10-CM

## 2021-07-09 DIAGNOSIS — N72 CERVICITIS: Primary | ICD-10-CM

## 2021-07-09 LAB
APPEARANCE UR: ABNORMAL
BACTERIA URNS QL MICRO: ABNORMAL /HPF
BILIRUB UR QL: NEGATIVE
COLOR UR: ABNORMAL
GLUCOSE UR STRIP.AUTO-MCNC: NEGATIVE MG/DL
HCG UR QL: NEGATIVE
HGB UR QL STRIP: NEGATIVE
KETONES UR QL STRIP.AUTO: NEGATIVE MG/DL
KOH PREP SPEC: NORMAL
LEUKOCYTE ESTERASE UR QL STRIP.AUTO: ABNORMAL
MUCOUS THREADS URNS QL MICRO: ABNORMAL /LPF
NITRITE UR QL STRIP.AUTO: NEGATIVE
PH UR STRIP: 7 [PH] (ref 5–8)
PROT UR STRIP-MCNC: NEGATIVE MG/DL
RBC #/AREA URNS HPF: ABNORMAL /HPF (ref 0–5)
SP GR UR REFRACTOMETRY: 1.02 (ref 1–1.03)
SPECIAL REQUESTS,SREQ: NORMAL
T VAGINALIS VAG QL WET PREP: NEGATIVE
UA: UC IF INDICATED,UAUC: ABNORMAL
UROBILINOGEN UR QL STRIP.AUTO: 4 EU/DL (ref 0.1–1)
WBC URNS QL MICRO: ABNORMAL /HPF (ref 0–4)

## 2021-07-09 PROCEDURE — 99283 EMERGENCY DEPT VISIT LOW MDM: CPT

## 2021-07-09 PROCEDURE — 87491 CHLMYD TRACH DNA AMP PROBE: CPT

## 2021-07-09 PROCEDURE — 74011250636 HC RX REV CODE- 250/636: Performed by: NURSE PRACTITIONER

## 2021-07-09 PROCEDURE — 96372 THER/PROPH/DIAG INJ SC/IM: CPT

## 2021-07-09 PROCEDURE — 87210 SMEAR WET MOUNT SALINE/INK: CPT

## 2021-07-09 PROCEDURE — 99284 EMERGENCY DEPT VISIT MOD MDM: CPT

## 2021-07-09 PROCEDURE — 81001 URINALYSIS AUTO W/SCOPE: CPT

## 2021-07-09 PROCEDURE — 74011250637 HC RX REV CODE- 250/637: Performed by: NURSE PRACTITIONER

## 2021-07-09 PROCEDURE — 81025 URINE PREGNANCY TEST: CPT

## 2021-07-09 PROCEDURE — 74011000250 HC RX REV CODE- 250: Performed by: NURSE PRACTITIONER

## 2021-07-09 RX ORDER — AZITHROMYCIN 500 MG/1
1000 TABLET, FILM COATED ORAL
Status: COMPLETED | OUTPATIENT
Start: 2021-07-09 | End: 2021-07-09

## 2021-07-09 RX ORDER — ONDANSETRON 4 MG/1
4 TABLET, ORALLY DISINTEGRATING ORAL
Status: COMPLETED | OUTPATIENT
Start: 2021-07-09 | End: 2021-07-09

## 2021-07-09 RX ORDER — METRONIDAZOLE 500 MG/1
2000 TABLET ORAL
Status: COMPLETED | OUTPATIENT
Start: 2021-07-09 | End: 2021-07-09

## 2021-07-09 RX ADMIN — ONDANSETRON 4 MG: 4 TABLET, ORALLY DISINTEGRATING ORAL at 20:47

## 2021-07-09 RX ADMIN — AZITHROMYCIN MONOHYDRATE 1000 MG: 500 TABLET ORAL at 20:47

## 2021-07-09 RX ADMIN — LIDOCAINE HYDROCHLORIDE 500 MG: 10 INJECTION, SOLUTION INFILTRATION; PERINEURAL at 20:46

## 2021-07-09 RX ADMIN — METRONIDAZOLE 2000 MG: 500 TABLET ORAL at 20:47

## 2021-07-10 NOTE — ED PROVIDER NOTES
EMERGENCY DEPARTMENT HISTORY AND PHYSICAL EXAM      Date: 7/9/2021  Patient Name: Imelda Moore    History of Presenting Illness     Chief Complaint   Patient presents with    Vaginal Discharge       History Provided By: Patient    HPI: Imelda Moore, 23 y.o. female with a past medical history significant No significant past medical history presents to the ED with cc of dysuria. Patient reports burning with urination and abnormal yellow vaginal discharge over the last 5 days. She describes her pain as moderate, burning irritation feeling. She has not tried taking anything for her symptoms. SHe admits to STD concerns. She denies any fever/chills, abdominal pain, flank pain, hematuria, perineal rashes or sores. LMP: Depo    There are no other complaints, changes, or physical findings at this time. PCP: Daysi Garcia NP    No current facility-administered medications on file prior to encounter. Current Outpatient Medications on File Prior to Encounter   Medication Sig Dispense Refill    fluticasone propionate (FLONASE) 50 mcg/actuation nasal spray SPRAY 2 SPRAYS INTO EACH NOSTRIL DAILY 1 Bottle 1    montelukast (SINGULAIR) 10 mg tablet TAKE 1 TABLET BY MOUTH EVERY DAY 90 Tab 1    medroxyPROGESTERone (Depo-Provera) 150 mg/mL syrg Inject 1 ml IM every 3 months. 1 Syringe 3    fluconazole (DIFLUCAN) 150 mg tablet Take 150 mg by mouth daily. FDA advises cautious prescribing of oral fluconazole in pregnancy. (Patient not taking: Reported on 7/9/2021)      azithromycin (ZITHROMAX) 250 mg tablet Take 250 mg by mouth daily. (Patient not taking: Reported on 7/9/2021)      ciprofloxacin HCl (CIPRO) 500 mg tablet Take  by mouth two (2) times a day. (Patient not taking: Reported on 7/9/2021)      ibuprofen (MOTRIN) 800 mg tablet Take  by mouth.  loratadine (CLARITIN) 10 mg tablet Take 10 mg by mouth.       phenazopyridine (PYRIDIUM) 200 mg tablet Take  by mouth three (3) times daily as needed for Pain. (Patient not taking: Reported on 2021)         Past History     Past Medical History:  Past Medical History:   Diagnosis Date    Amenorrhea 10/26/2020    Family history of diabetes mellitus 10/26/2020    Migraines     Miscarriage 10/26/2020    Seizure disorder ([address] 10/26/2020       Past Surgical History:  Past Surgical History:   Procedure Laterality Date    HX  SECTION      HX TONSILLECTOMY         Family History:  Family History   Problem Relation Age of Onset    Diabetes Maternal Grandmother        Social History:  Social History     Tobacco Use    Smoking status: Never Smoker    Smokeless tobacco: Never Used   Substance Use Topics    Alcohol use: Never    Drug use: Never       Allergies:  No Known Allergies      Review of Systems     Review of Systems   Constitutional: Negative for chills and fever. Gastrointestinal: Negative for abdominal pain. Genitourinary: Positive for dysuria and vaginal discharge. Negative for flank pain, genital sores and hematuria. All other systems reviewed and are negative. Physical Exam     Physical Exam  Vitals and nursing note reviewed. Exam conducted with a chaperone present. Constitutional:       General: She is not in acute distress. Appearance: Normal appearance. She is overweight. HENT:      Head: Normocephalic and atraumatic. Eyes:      Extraocular Movements: Extraocular movements intact. Conjunctiva/sclera: Conjunctivae normal.   Cardiovascular:      Rate and Rhythm: Normal rate and regular rhythm. Heart sounds: Normal heart sounds. Pulmonary:      Effort: Pulmonary effort is normal.      Breath sounds: Normal breath sounds. No wheezing or rales. Abdominal:      General: Bowel sounds are normal.      Palpations: Abdomen is soft. Tenderness: There is abdominal tenderness in the suprapubic area. There is no right CVA tenderness, left CVA tenderness, guarding or rebound.    Genitourinary:     General: Normal vulva. Labia:         Right: No rash. Left: No lesion. Vagina: Vaginal discharge and erythema present. No lesions. Cervix: Discharge and erythema present. No friability or lesion. Uterus: Normal.       Adnexa: Right adnexa normal and left adnexa normal.   Musculoskeletal:         General: Normal range of motion. Cervical back: Normal range of motion and neck supple. Skin:     General: Skin is warm and dry. Neurological:      General: No focal deficit present. Mental Status: She is alert. Psychiatric:         Mood and Affect: Mood normal.         Behavior: Behavior normal. Behavior is cooperative. Lab and Diagnostic Study Results     Labs -     Admission on 07/09/2021, Discharged on 07/09/2021   Component Date Value    Color 07/09/2021 Yellow/Straw     Appearance 07/09/2021 Turbid*    Specific gravity 07/09/2021 1.020     pH (UA) 07/09/2021 7.0     Protein 07/09/2021 Negative     Glucose 07/09/2021 Negative     Ketone 07/09/2021 Negative     Bilirubin 07/09/2021 Negative     Blood 07/09/2021 Negative     Urobilinogen 07/09/2021 4.0*    Nitrites 07/09/2021 Negative     Leukocyte Esterase 07/09/2021 Moderate*    UA:UC IF INDICATED 07/09/2021 Culture not indicated by UA result     WBC 07/09/2021 0-4     RBC 07/09/2021 0-5     Bacteria 07/09/2021 1+*    Mucus 07/09/2021 Trace     HCG urine, QL 07/09/2021 Negative     Wet prep 07/09/2021 Negative     Special Requests: 07/09/2021 No Special Requests     KOH 07/09/2021 No yeast seen     Source 07/09/2021 Vagina     Chlamydia trachomatis, N* 07/09/2021 Negative     Neisseria gonorrhoeae, N* 07/09/2021 Negative     Please note 07/09/2021 <<DO NOT REPORT>>      Radiologic Studies -   @lastxrresult@  CT Results  (Last 48 hours)    None        CXR Results  (Last 48 hours)    None            Medical Decision Making   - I am the first provider for this patient.     - I reviewed the vital signs, available nursing notes, past medical history, past surgical history, family history and social history. - Initial assessment performed. The patients presenting problems have been discussed, and they are in agreement with the care plan formulated and outlined with them. I have encouraged them to ask questions as they arise throughout their visit. Vital Signs-Reviewed the patient's vital signs. See chart    Records Reviewed: Nursing Notes    The patient presents with dysuria with a differential diagnosis of UTI, pyelonephritis, STD      ED Course:   Patient presents with dysuria, vaginitis and STD concern. VS normal. Labs: UA+ leukoesterase, cultures pending. She elects empiric treatment today and was given a Cipro, Rocephin, Flagyl. Reinforced importance of abstinence until receiving results and partners being treated in addition if positive. Discussed worrisome reasons to return to the department including worsening symptoms or abdominal pain. PCP/GYN follow-up       Provider Notes (Medical Decision Making):     MDM  Number of Diagnoses or Management Options  Cervicitis: new, needed workup  Vaginitis and vulvovaginitis: new, needed workup     Amount and/or Complexity of Data Reviewed  Clinical lab tests: ordered and reviewed  Tests in the medicine section of CPT®: ordered and reviewed    Risk of Complications, Morbidity, and/or Mortality  Presenting problems: moderate  Diagnostic procedures: moderate  Management options: low    Patient Progress  Patient progress: stable         Procedures   Medical Decision Makingedical Decision Making  Performed by: Emperatriz Capone NP  PROCEDURES:  Pelvic Exam    Date/Time: 7/9/2021 8:30 PM  Performed by: NP  Procedure duration:  5 minutes. Exam assisted by:  nursing. Type of exam performed: bimanual and speculum. Vaginal exam:  inflammation and discharge. Cervical exam:  minimal cervical motion tenderness, os closed and abnormal (irritated).     Specimen(s) collected: chlamydia, GC and vaginal culture. Bimanual exam:  normal.    Patient tolerance: patient tolerated the procedure well with no immediate complications             Disposition   Disposition: Condition stable  DC-The patient was given verbal follow-up instructions    Discharged    DISCHARGE PLAN:  1. Current Discharge Medication List      CONTINUE these medications which have NOT CHANGED    Details   fluticasone propionate (FLONASE) 50 mcg/actuation nasal spray SPRAY 2 SPRAYS INTO EACH NOSTRIL DAILY  Qty: 1 Bottle, Refills: 1      montelukast (SINGULAIR) 10 mg tablet TAKE 1 TABLET BY MOUTH EVERY DAY  Qty: 90 Tab, Refills: 1      medroxyPROGESTERone (Depo-Provera) 150 mg/mL syrg Inject 1 ml IM every 3 months. Qty: 1 Syringe, Refills: 3    Associated Diagnoses: Suppression of menses      fluconazole (DIFLUCAN) 150 mg tablet Take 150 mg by mouth daily. FDA advises cautious prescribing of oral fluconazole in pregnancy. azithromycin (ZITHROMAX) 250 mg tablet Take 250 mg by mouth daily. ciprofloxacin HCl (CIPRO) 500 mg tablet Take  by mouth two (2) times a day. ibuprofen (MOTRIN) 800 mg tablet Take  by mouth.      loratadine (CLARITIN) 10 mg tablet Take 10 mg by mouth.      phenazopyridine (PYRIDIUM) 200 mg tablet Take  by mouth three (3) times daily as needed for Pain. 2.   Follow-up Information     Follow up With Specialties Details Why Contact Info    Gopal Carmona NP Nurse Practitioner Schedule an appointment as soon as possible for a visit  for ER follow up [address] [place] [address]  [phone]      Your GYN  Schedule an appointment as soon as possible for a visit  for ER follow up         3. Return to ED if worse   4. Discharge Medication List as of 7/9/2021  8:51 PM            Diagnosis     Clinical Impression:   1. Cervicitis    2. Vaginitis and vulvovaginitis    3.  STD exposure        Attestations:    Angelica Stafford NP    Please note that this dictation was completed with Dragon, the computer voice recognition software. Quite often unanticipated grammatical, syntax, homophones, and other interpretive errors are inadvertently transcribed by the computer software. Please disregard these errors. Please excuse any errors that have escaped final proofreading. Thank you.

## 2021-07-12 LAB
C TRACH RRNA SPEC QL NAA+PROBE: NEGATIVE
N GONORRHOEA RRNA SPEC QL NAA+PROBE: NEGATIVE
PLEASE NOTE:, 188601: NORMAL
SPECIMEN SOURCE: NORMAL

## 2021-09-22 DIAGNOSIS — N94.89 SUPPRESSION OF MENSES: ICD-10-CM

## 2021-09-23 RX ORDER — MEDROXYPROGESTERONE ACETATE 150 MG/ML
INJECTION, SUSPENSION INTRAMUSCULAR
Qty: 1 EACH | Refills: 0 | Status: SHIPPED | OUTPATIENT
Start: 2021-09-23 | End: 2021-09-29

## 2021-09-24 ENCOUNTER — HOSPITAL ENCOUNTER (EMERGENCY)
Age: 26
Discharge: HOME | End: 2021-09-24
Payer: MEDICAID

## 2021-09-24 VITALS
BODY MASS INDEX: 24.75 KG/M2 | SYSTOLIC BLOOD PRESSURE: 122 MMHG | WEIGHT: 145 LBS | HEIGHT: 64 IN | DIASTOLIC BLOOD PRESSURE: 64 MMHG | OXYGEN SATURATION: 99 % | RESPIRATION RATE: 16 BRPM | HEART RATE: 97 BPM | TEMPERATURE: 98.6 F

## 2021-09-24 DIAGNOSIS — J06.9 ACUTE URI: Primary | ICD-10-CM

## 2021-09-24 PROCEDURE — 99282 EMERGENCY DEPT VISIT SF MDM: CPT

## 2021-09-24 PROCEDURE — U0005 INFEC AGEN DETEC AMPLI PROBE: HCPCS

## 2021-09-24 NOTE — ED NOTES
EMERGENCY DEPARTMENT HISTORY AND PHYSICAL EXAM      Date: 9/24/2021  Patient Name: Vicky Wong    History of Presenting Illness     Chief Complaint   Patient presents with    Covid Testing       History Provided By: Patient    HPI: Vicky Wong, 23 y.o. female with a past medical history significant No significant past medical history presents to the ED with cc of generalized body aches, cough, and a headache. States she has had positive COVID contacts at work. Denies being vaccinated for COVID. Pt reports that she has been treating sx's with Ibuprofen with minimal relief. No further complaints or concerns. Specifically denies any chest pain, shortness of breath, fever, abdominal pain, N/V/D, diaphoresis, or rash. There are no other complaints, changes, or physical findings at this time. PCP: Tequila Whelan NP    No current facility-administered medications on file prior to encounter. Current Outpatient Medications on File Prior to Encounter   Medication Sig Dispense Refill    fluticasone propionate (FLONASE) 50 mcg/actuation nasal spray SPRAY 2 SPRAYS INTO EACH NOSTRIL DAILY 1 Bottle 1    montelukast (SINGULAIR) 10 mg tablet TAKE 1 TABLET BY MOUTH EVERY DAY 90 Tab 1    fluconazole (DIFLUCAN) 150 mg tablet Take 150 mg by mouth daily. FDA advises cautious prescribing of oral fluconazole in pregnancy. (Patient not taking: Reported on 7/9/2021)      azithromycin (ZITHROMAX) 250 mg tablet Take 250 mg by mouth daily. (Patient not taking: Reported on 7/9/2021)      ciprofloxacin HCl (CIPRO) 500 mg tablet Take  by mouth two (2) times a day. (Patient not taking: Reported on 7/9/2021)      ibuprofen (MOTRIN) 800 mg tablet Take  by mouth.  loratadine (CLARITIN) 10 mg tablet Take 10 mg by mouth.  phenazopyridine (PYRIDIUM) 200 mg tablet Take  by mouth three (3) times daily as needed for Pain.  (Patient not taking: Reported on 7/9/2021)         Past History     Past Medical History:  Past Medical History:   Diagnosis Date    Amenorrhea 10/26/2020    Family history of diabetes mellitus 10/26/2020    Migraines     Miscarriage 10/26/2020    Seizure disorder ([address] 10/26/2020       Past Surgical History:  Past Surgical History:   Procedure Laterality Date    HX  SECTION      HX TONSILLECTOMY         Family History:  Family History   Problem Relation Age of Onset    Diabetes Maternal Grandmother        Social History:  Social History     Tobacco Use    Smoking status: Never Smoker    Smokeless tobacco: Never Used   Substance Use Topics    Alcohol use: Never    Drug use: Never       Allergies:  No Known Allergies      Review of Systems     Review of Systems   Constitutional: Negative. Negative for chills, diaphoresis and fever. HENT: Positive for congestion. Negative for ear pain, rhinorrhea, sore throat and trouble swallowing. Eyes: Negative. Respiratory: Positive for cough. Negative for shortness of breath and wheezing. Cardiovascular: Negative. Negative for chest pain. Gastrointestinal: Negative. Negative for abdominal pain, diarrhea, nausea and vomiting. Genitourinary: Negative. Negative for difficulty urinating, dysuria and frequency. Musculoskeletal: Positive for myalgias. Skin: Negative. Negative for rash. Neurological: Positive for headaches. Negative for dizziness, weakness and numbness. Psychiatric/Behavioral: Negative. All other systems reviewed and are negative. Physical Exam     Physical Exam  Vitals and nursing note reviewed. Constitutional:       General: She is not in acute distress. Appearance: Normal appearance. She is not ill-appearing or toxic-appearing. HENT:      Head: Normocephalic and atraumatic. Mouth/Throat:      Mouth: Mucous membranes are moist.      Pharynx: Oropharynx is clear. Eyes:      Extraocular Movements: Extraocular movements intact.       Conjunctiva/sclera: Conjunctivae normal.      Pupils: Pupils are equal, round, and reactive to light. Cardiovascular:      Rate and Rhythm: Normal rate and regular rhythm. Pulmonary:      Effort: Pulmonary effort is normal.      Breath sounds: Normal breath sounds. No wheezing, rhonchi or rales. Abdominal:      General: There is no distension. Palpations: Abdomen is soft. Tenderness: There is no abdominal tenderness. There is no guarding or rebound. Musculoskeletal:      Cervical back: Neck supple. No tenderness. Skin:     General: Skin is warm and dry. Capillary Refill: Capillary refill takes less than 2 seconds. Findings: No rash. Neurological:      General: No focal deficit present. Mental Status: She is alert and oriented to person, place, and time. Psychiatric:         Mood and Affect: Mood normal.         Behavior: Behavior normal.         Lab and Diagnostic Study Results     Labs -   No results found for this or any previous visit (from the past 12 hour(s)). Radiologic Studies -   @lastxrresult@  CT Results  (Last 48 hours)    None        CXR Results  (Last 48 hours)    None            Medical Decision Making   - I am the first provider for this patient. - I reviewed the vital signs, available nursing notes, past medical history, past surgical history, family history and social history. - Initial assessment performed. The patients presenting problems have been discussed, and they are in agreement with the care plan formulated and outlined with them. I have encouraged them to ask questions as they arise throughout their visit. Vital Signs-Reviewed the patient's vital signs. No data found. Records Reviewed: Nursing Notes and Old Medical Records        ED Course:          Provider Notes (Medical Decision Making):     MDM  Number of Diagnoses or Management Options  Diagnosis management comments: Pt presents with acute URI symptoms including nasal congestion, rhinorrhea and sore throat.  Pt also has c/o of cough without dyspnea, chest pain or wheezing. Pt is well-appearing with stable vitals and benign exam; symptoms are consistent with an uncomplicated URI. DDx: COVID-19, acute bronchitis, bacterial sinusitis vs. pharyngitis, migraine, flu. Symptomatic therapy suggested: acetaminophen, ibuprofen, antihistamine-decongestant of choice, cough suppressant of choice. Increase fluids, use vaporizer, stay in steamy bathroom tid 15 min prn severe cough, tylenol as needed, rest, avoid smoky areas. Lack of antibiotic effectiveness discussed with her. Symptomatic therapy suggested: gargle for sore throat, use mist at bedside for congestion. Apply facial warm packs for sinus pain or use nasal saline sprays. Follow up prn if not better in 72 hours. Amount and/or Complexity of Data Reviewed  Clinical lab tests: ordered and reviewed  Review and summarize past medical records: yes           Procedures   Medical Decision Makingedical Decision Making  Performed by: Eleazar Anderson MD  PROCEDURES:  Procedures       Disposition   Disposition: DC- Adult Discharges: All of the diagnostic tests were reviewed and questions answered. Diagnosis, care plan and treatment options were discussed. The patient understands the instructions and will follow up as directed. The patients results have been reviewed with them. They have been counseled regarding their diagnosis. The patient verbally convey understanding and agreement of the signs, symptoms, diagnosis, treatment and prognosis and additionally agrees to follow up as recommended with their PCP in 24 - 48 hours. They also agree with the care-plan and convey that all of their questions have been answered.   I have also put together some discharge instructions for them that include: 1) educational information regarding their diagnosis, 2) how to care for their diagnosis at home, as well a 3) list of reasons why they would want to return to the ED prior to their follow-up appointment, should their condition change. Discharged    DISCHARGE PLAN:  1. Current Discharge Medication List      CONTINUE these medications which have NOT CHANGED    Details   medroxyPROGESTERone (DEPO-PROVERA) 150 mg/mL syrg INJECT 1 ML INTRAMUSCULAR EVERY 3 MONTHS. Qty: 1 Each, Refills: 0    Associated Diagnoses: Suppression of menses      fluticasone propionate (FLONASE) 50 mcg/actuation nasal spray SPRAY 2 SPRAYS INTO EACH NOSTRIL DAILY  Qty: 1 Bottle, Refills: 1      montelukast (SINGULAIR) 10 mg tablet TAKE 1 TABLET BY MOUTH EVERY DAY  Qty: 90 Tab, Refills: 1      fluconazole (DIFLUCAN) 150 mg tablet Take 150 mg by mouth daily. FDA advises cautious prescribing of oral fluconazole in pregnancy. azithromycin (ZITHROMAX) 250 mg tablet Take 250 mg by mouth daily. ciprofloxacin HCl (CIPRO) 500 mg tablet Take  by mouth two (2) times a day. ibuprofen (MOTRIN) 800 mg tablet Take  by mouth.      loratadine (CLARITIN) 10 mg tablet Take 10 mg by mouth.      phenazopyridine (PYRIDIUM) 200 mg tablet Take  by mouth three (3) times daily as needed for Pain. 2.   Follow-up Information     Follow up With Specialties Details Why Contact Info    Anyi Ulrich NP Nurse Practitioner  As needed, If symptoms worsen [address] [place] [address]  [phone]          3. Return to ED if worse   4. Discharge Medication List as of 9/24/2021  6:55 PM      CONTINUE these medications which have NOT CHANGED    Details   medroxyPROGESTERone (DEPO-PROVERA) 150 mg/mL syrg INJECT 1 ML INTRAMUSCULAR EVERY 3 MONTHS., Normal, Disp-1 Each, R-0      fluticasone propionate (FLONASE) 50 mcg/actuation nasal spray SPRAY 2 SPRAYS INTO EACH NOSTRIL DAILY, Normal, Disp-1 Bottle,R-1      montelukast (SINGULAIR) 10 mg tablet TAKE 1 TABLET BY MOUTH EVERY DAY, Normal, Disp-90 Tab,R-1      fluconazole (DIFLUCAN) 150 mg tablet Take 150 mg by mouth daily. FDA advises cautious prescribing of oral fluconazole in pregnancy. , Historical Med      azithromycin (ZITHROMAX) 250 mg tablet Take 250 mg by mouth daily. , Historical Med      ciprofloxacin HCl (CIPRO) 500 mg tablet Take  by mouth two (2) times a day., Historical Med      ibuprofen (MOTRIN) 800 mg tablet Take  by mouth., Historical Med      loratadine (CLARITIN) 10 mg tablet Take 10 mg by mouth., Historical Med      phenazopyridine (PYRIDIUM) 200 mg tablet Take  by mouth three (3) times daily as needed for Pain., Historical Med               Diagnosis     Clinical Impression:   1. Acute URI        Attestations:    Darshana Espino MD    Please note that this dictation was completed with [employer], the [employer] voice recognition software. Quite often unanticipated grammatical, syntax, homophones, and other interpretive errors are inadvertently transcribed by the computer software. Please disregard these errors. Please excuse any errors that have escaped final proofreading. Thank you.

## 2021-09-25 LAB — SARS-COV-2, COV2: NORMAL

## 2021-09-26 LAB
SARS-COV-2, XPLCVT: ABNORMAL
SOURCE, COVRS: ABNORMAL

## 2021-09-27 ENCOUNTER — PATIENT OUTREACH (OUTPATIENT)
Dept: CASE MANAGEMENT | Age: 26
End: 2021-09-27

## 2021-09-27 NOTE — PROGRESS NOTES
Patient answered , requested to return call in 5 mins will await return call. Patient contacted regarding COVID-19 diagnosis. Discussed COVID-19 related testing which was available at this time. Test results were positive. Patient informed of results, if available? yes. Ambulatory Care Manager contacted the patient by telephone to perform post discharge assessment. Call within 2 business days of discharge: Yes Verified name and  with patient as identifiers. Provided introduction to self, and explanation of the CTN/ACM role, and reason for call due to risk factors for infection and/or exposure to COVID-19. Symptoms reviewed with patient who verbalized the following symptoms: loss or taste or smell      Due to no new or worsening symptoms encounter was not routed to provider for escalation. Discussed follow-up appointments. If no appointment was previously scheduled, appointment scheduling offered:  Recommended patient contact pcp to schedule a VV . [facility] follow up appointment(s):   Future Appointments   Date Time Provider Harriet Israel   [facility]21 11[facility]30 [facility]     [facility] follow up appointment(s): none    Interventions to address risk factors: discussed isolation period , family quarantine period, cleaning household surfaces with bleach,      Advance Care Planning:   Does patient have an Advance Directive: not on file. Educated patient about risk for severe COVID-19 due to risk factors according to CDC guidelines. ACM reviewed discharge instructions, medical action plan and red flag symptoms with the patient who verbalized understanding. Discussed COVID vaccination status: no. Education provided on COVID-19 vaccination as appropriate. Discussed exposure protocols and quarantine with CDC Guidelines.  Patient was given an opportunity to verbalize any questions and concerns and agrees to contact ACM or health care provider for questions related to their healthcare. Reviewed and educated patient on any new and changed medications related to discharge diagnosis     Was patient discharged with a pulse oximeter? no Discussed and confirmed pulse oximeter discharge instructions and when to notify provider or seek emergency care. ACM provided contact information. Plan for follow-up call in 5-7 days based on severity of symptoms and risk factors.

## 2021-09-28 DIAGNOSIS — N94.89 SUPPRESSION OF MENSES: ICD-10-CM

## 2021-09-28 RX ORDER — ONDANSETRON 4 MG/1
4 TABLET, ORALLY DISINTEGRATING ORAL
Qty: 10 TABLET | Refills: 1 | OUTPATIENT
Start: 2021-09-28 | End: 2022-01-04

## 2021-09-28 RX ORDER — ALBUTEROL SULFATE 90 UG/1
2 AEROSOL, METERED RESPIRATORY (INHALATION)
Qty: 6.7 G | Refills: 0 | OUTPATIENT
Start: 2021-09-28 | End: 2022-01-04

## 2021-09-29 RX ORDER — MEDROXYPROGESTERONE ACETATE 150 MG/ML
INJECTION, SUSPENSION INTRAMUSCULAR
Qty: 1 ML | Refills: 1 | Status: SHIPPED | OUTPATIENT
Start: 2021-09-29 | End: 2022-05-19

## 2021-10-03 ENCOUNTER — APPOINTMENT (OUTPATIENT)
Dept: GENERAL RADIOLOGY | Age: 26
End: 2021-10-03
Attending: NURSE PRACTITIONER
Payer: MEDICAID

## 2021-10-03 ENCOUNTER — HOSPITAL ENCOUNTER (EMERGENCY)
Age: 26
Discharge: HOME | End: 2021-10-03
Admitting: STUDENT IN AN ORGANIZED HEALTH CARE EDUCATION/TRAINING PROGRAM
Payer: MEDICAID

## 2021-10-03 VITALS
BODY MASS INDEX: 23.39 KG/M2 | WEIGHT: 137 LBS | TEMPERATURE: 98.9 F | DIASTOLIC BLOOD PRESSURE: 79 MMHG | OXYGEN SATURATION: 98 % | SYSTOLIC BLOOD PRESSURE: 113 MMHG | HEIGHT: 64 IN | RESPIRATION RATE: 16 BRPM | HEART RATE: 85 BPM

## 2021-10-03 DIAGNOSIS — F41.1 ANXIETY STATE: ICD-10-CM

## 2021-10-03 DIAGNOSIS — U07.1 COVID-19 VIRUS INFECTION: Primary | ICD-10-CM

## 2021-10-03 PROCEDURE — 74011250637 HC RX REV CODE- 250/637: Performed by: NURSE PRACTITIONER

## 2021-10-03 PROCEDURE — 99283 EMERGENCY DEPT VISIT LOW MDM: CPT

## 2021-10-03 PROCEDURE — 71045 X-RAY EXAM CHEST 1 VIEW: CPT

## 2021-10-03 RX ORDER — DEXAMETHASONE SODIUM PHOSPHATE 10 MG/ML
10 INJECTION INTRAMUSCULAR; INTRAVENOUS ONCE
Status: COMPLETED | OUTPATIENT
Start: 2021-10-03 | End: 2021-10-03

## 2021-10-03 RX ORDER — IBUPROFEN 600 MG/1
600 TABLET ORAL
Status: COMPLETED | OUTPATIENT
Start: 2021-10-03 | End: 2021-10-03

## 2021-10-03 RX ADMIN — DEXAMETHASONE SODIUM PHOSPHATE 10 MG: 10 INJECTION INTRAMUSCULAR; INTRAVENOUS at 01:34

## 2021-10-03 RX ADMIN — IBUPROFEN 600 MG: 600 TABLET ORAL at 01:34

## 2021-10-03 NOTE — DISCHARGE INSTRUCTIONS
Thank you! Thank you for allowing me to care for you in the emergency department. I sincerely hope that you are satisfied with your visit today. It is my goal to provide you with excellent care. Below you will find a list of your labs and imaging from your visit today. Should you have any questions regarding these results please do not hesitate to call the emergency department. Labs -   No results found for this or any previous visit (from the past 12 hour(s)). Radiologic Studies -   XR CHEST PORT   Final Result   No specific acute or active process. .                  CT Results  (Last 48 hours)      None          CXR Results  (Last 48 hours)                 10/03/21 0139  XR CHEST PORT Final result    Impression:  No specific acute or active process. .                    Narrative:  PROCEDURE: XR CHEST PORT       HISTORY:Short of breath       COMPARISON:Chest x-ray 14 April 2021           TECHNIQUE: AP portable chest       LIMITATIONS: None       TUBES/LINES: None       LUNG PARENCHYMA/PLEURA: Normal   TRACHEA/BRONCHI: Normal   PULMONARY VESSELS: Normal   HEART: Normal   MEDIASTINUM: Normal   BONE/SOFT TISSUES: No acute process       OTHER: None                      If you feel that you have not received excellent quality care or timely care, please ask to speak to the nurse manager. Please choose us in the future for your continued health care needs. ------------------------------------------------------------------------------------------------------------  The exam and treatment you received in the Emergency Department were for an urgent problem and are not intended as complete care. It is important that you follow-up with a doctor, nurse practitioner, or physician assistant to:  (1) confirm your diagnosis,  (2) re-evaluation of changes in your illness and treatment, and  (3) for ongoing care.   If your symptoms become worse or you do not improve as expected and you are unable to reach your usual health care provider, you should return to the Emergency Department. We are available 24 hours a day. Please take your discharge instructions with you when you go to your follow-up appointment. If you have any problem arranging a follow-up appointment, contact the Emergency Department immediately. If a prescription has been provided, please have it filled as soon as possible to prevent a delay in treatment. Read the entire medication instruction sheet provided to you by the pharmacy. If you have any questions or reservations about taking the medication due to side effects or interactions with other medications, please call your primary care physician or contact the ER to speak with the charge nurse. Make an appointment with your family doctor or the physician you were referred to for follow-up of this visit as instructed on your discharge paperwork, as this is a mandatory follow-up. Return to the ER if you are unable to be seen or if you are unable to be seen in a timely manner. If you have any problem arranging the follow-up visit, contact the Emergency Department immediately.

## 2021-10-03 NOTE — ED PROVIDER NOTES
EMERGENCY DEPARTMENT HISTORY AND PHYSICAL EXAM      Date: 10/3/2021  Patient Name: Vicky Wong    History of Presenting Illness     Chief Complaint   Patient presents with    Chest Pain    Shortness of Breath       History Provided By: Patient    HPI: Vicky Wong, 23 y.o. female with a past medical history significant No significant past medical history presents to the ED with cc of shortness of breath and chest tightness. Patient states she was diagnosed Covid +9/24/21. She states she is feeling somewhat better but reports chest tightness and some shortness of breath when trying to lay down at night. She admits to feeling anxious and states she is scared to sleep as she might not wake up. She was previously given albuterol inhaler which she has tried once with no improvement. She denies any further fevers, N/V/D or any other associated symptoms. There are no other complaints, changes, or physical findings at this time. PCP: Tequila Whelan NP    No current facility-administered medications on file prior to encounter. Current Outpatient Medications on File Prior to Encounter   Medication Sig Dispense Refill    medroxyPROGESTERone (DEPO-PROVERA) 150 mg/mL injection INJECT 1 ML INTRAMUSCULAR EVERY 3 MONTHS. 1 mL 1    albuterol (Ventolin HFA) 90 mcg/actuation inhaler Take 2 Puffs by inhalation every four (4) hours as needed for Wheezing. 6.7 g 0    ondansetron (Zofran ODT) 4 mg disintegrating tablet Take 1 Tablet by mouth every eight (8) hours as needed for Nausea or Vomiting. 10 Tablet 1    fluticasone propionate (FLONASE) 50 mcg/actuation nasal spray SPRAY 2 SPRAYS INTO EACH NOSTRIL DAILY 1 Bottle 1    montelukast (SINGULAIR) 10 mg tablet TAKE 1 TABLET BY MOUTH EVERY DAY 90 Tab 1    fluconazole (DIFLUCAN) 150 mg tablet Take 150 mg by mouth daily. FDA advises cautious prescribing of oral fluconazole in pregnancy.  (Patient not taking: Reported on 7/9/2021)      azithromycin (ZITHROMAX) 250 mg tablet Take 250 mg by mouth daily. (Patient not taking: Reported on 2021)      ciprofloxacin HCl (CIPRO) 500 mg tablet Take  by mouth two (2) times a day. (Patient not taking: Reported on 2021)      ibuprofen (MOTRIN) 800 mg tablet Take  by mouth.  loratadine (CLARITIN) 10 mg tablet Take 10 mg by mouth.  phenazopyridine (PYRIDIUM) 200 mg tablet Take  by mouth three (3) times daily as needed for Pain. (Patient not taking: Reported on 2021)         Past History     Past Medical History:  Past Medical History:   Diagnosis Date    Amenorrhea 10/26/2020    Family history of diabetes mellitus 10/26/2020    Migraines     Miscarriage 10/26/2020    Seizure disorder ([address] 10/26/2020       Past Surgical History:  Past Surgical History:   Procedure Laterality Date    HX  SECTION      HX TONSILLECTOMY         Family History:  Family History   Problem Relation Age of Onset    Diabetes Maternal Grandmother        Social History:  Social History     Tobacco Use    Smoking status: Never Smoker    Smokeless tobacco: Never Used   Substance Use Topics    Alcohol use: Never    Drug use: Never       Allergies:  No Known Allergies      Review of Systems     Review of Systems   Constitutional: Negative for fever. Respiratory: Positive for cough, chest tightness and shortness of breath. Negative for wheezing. Gastrointestinal: Negative. Negative for diarrhea, nausea and vomiting. Psychiatric/Behavioral: The patient is nervous/anxious. All other systems reviewed and are negative. Physical Exam     Physical Exam  Vitals and nursing note reviewed. Constitutional:       General: She is not in acute distress. Appearance: Normal appearance. She is not toxic-appearing. HENT:      Right Ear: Tympanic membrane normal.      Left Ear: Tympanic membrane normal.      Nose: Rhinorrhea present. Rhinorrhea is clear. Mouth/Throat:      Lips: Pink.       Mouth: Mucous membranes are moist.      Pharynx: Oropharynx is clear. Eyes:      Extraocular Movements: Extraocular movements intact. Conjunctiva/sclera: Conjunctivae normal.   Cardiovascular:      Rate and Rhythm: Normal rate and regular rhythm. Heart sounds: Normal heart sounds. Pulmonary:      Effort: Pulmonary effort is normal. No tachypnea or accessory muscle usage. Breath sounds: Normal breath sounds. No wheezing or rales. Chest:      Chest wall: Tenderness present. No crepitus. Abdominal:      General: Bowel sounds are normal.      Palpations: Abdomen is soft. Tenderness: There is no abdominal tenderness. Musculoskeletal:         General: Normal range of motion. Skin:     General: Skin is warm and dry. Neurological:      General: No focal deficit present. Mental Status: She is alert. Psychiatric:         Mood and Affect: Mood is anxious. Affect is flat. Behavior: Behavior normal. Behavior is cooperative. Lab and Diagnostic Study Results     Labs -   No results found for this or any previous visit (from the past 12 hour(s)). Radiologic Studies -   XR Results (most recent):  Results from Hospital Encounter encounter on 10/03/21    XR CHEST PORT    Narrative  PROCEDURE: XR CHEST PORT    HISTORY:Short of breath    COMPARISON:Chest x-ray 14 April 2021      TECHNIQUE: AP portable chest    LIMITATIONS: None    TUBES/LINES: None    LUNG PARENCHYMA/PLEURA: Normal  TRACHEA/BRONCHI: Normal  PULMONARY VESSELS: Normal  HEART: Normal  MEDIASTINUM: Normal  BONE/SOFT TISSUES: No acute process    OTHER: None    Impression  No specific acute or active process. .        Medical Decision Making   - I am the first provider for this patient. - I reviewed the vital signs, available nursing notes, past medical history, past surgical history, family history and social history. - Initial assessment performed.  The patients presenting problems have been discussed, and they are in agreement with the care plan formulated and outlined with them. I have encouraged them to ask questions as they arise throughout their visit. Vital Signs-Reviewed the patient's vital signs. See chart    Records Reviewed: Nursing Notes    The patient presents with SOB with a differential diagnosis of PNA, Covid, anxiety      ED Course:   Patient presents with shortness of breath. She reports Covid positive diagnosis 1 week ago. She states overall she is feeling better however notes some chest tightness and SOB at nighttime when laying down. No asthma history. She admits to feeling very anxious and states \"I am scared I will not wake up \". Vital signs normalafebrile, no hypoxia, Ambulating O2 maintaining 98-99%. CXR normal.  Patient was given IBU and Decadron p.o. She has albuterol MDI previously given however has not been using. Discussed symptomatic treatment and worrisome reasons to return to the department including worsening symptoms or dyspnea. Patient encouraged to use albuterol MDI as needed.   PCP follow-up  ED Course as of Oct 05 0302   Skylar Early Oct 03, 2021   0245 Discussed results with pt, she states she's feeling a little better; per nursing pt maintained room air sats 98-99% while walking    [LP]      ED Course User Index  [LP] Waldo Peoples NP       Provider Notes (Medical Decision Making):     MDM  Number of Diagnoses or Management Options  Anxiety state: minor  COVID-19 virus infection: established, worsening     Amount and/or Complexity of Data Reviewed  Tests in the radiology section of CPT®: ordered and reviewed    Risk of Complications, Morbidity, and/or Mortality  Presenting problems: low  Diagnostic procedures: low  Management options: low    Patient Progress  Patient progress: stable           Disposition   Disposition: Condition stable and improved  DC-The patient was given verbal fever treatment  and follow-up instructions  DC- Pain Control DC Home plan: Nonsteroidals, Tylenol and Referral Family Medicine/PCP    Discharged    DISCHARGE PLAN:  1. Current Discharge Medication List      CONTINUE these medications which have NOT CHANGED    Details   medroxyPROGESTERone (DEPO-PROVERA) 150 mg/mL injection INJECT 1 ML INTRAMUSCULAR EVERY 3 MONTHS. Qty: 1 mL, Refills: 1    Associated Diagnoses: Suppression of menses      albuterol (Ventolin HFA) 90 mcg/actuation inhaler Take 2 Puffs by inhalation every four (4) hours as needed for Wheezing. Qty: 6.7 g, Refills: 0      ondansetron (Zofran ODT) 4 mg disintegrating tablet Take 1 Tablet by mouth every eight (8) hours as needed for Nausea or Vomiting. Qty: 10 Tablet, Refills: 1      fluticasone propionate (FLONASE) 50 mcg/actuation nasal spray SPRAY 2 SPRAYS INTO EACH NOSTRIL DAILY  Qty: 1 Bottle, Refills: 1      montelukast (SINGULAIR) 10 mg tablet TAKE 1 TABLET BY MOUTH EVERY DAY  Qty: 90 Tab, Refills: 1      fluconazole (DIFLUCAN) 150 mg tablet Take 150 mg by mouth daily. FDA advises cautious prescribing of oral fluconazole in pregnancy. azithromycin (ZITHROMAX) 250 mg tablet Take 250 mg by mouth daily. ciprofloxacin HCl (CIPRO) 500 mg tablet Take  by mouth two (2) times a day. ibuprofen (MOTRIN) 800 mg tablet Take  by mouth.      loratadine (CLARITIN) 10 mg tablet Take 10 mg by mouth.      phenazopyridine (PYRIDIUM) 200 mg tablet Take  by mouth three (3) times daily as needed for Pain. 2.   Follow-up Information     Follow up With Specialties Details Why Contact Info    Sofi Hicks NP Nurse Practitioner Schedule an appointment as soon as possible for a visit in 2 days for ER follow up [address]      [facility] EMERGENCY DEPT Emergency Medicine In 1 day If symptoms worsen [address] [facility] [address] [phone]        3. Return to ED if worse   4. Discharge Medication List as of 10/3/2021  3:09 AM            Diagnosis     Clinical Impression:   1.  COVID-19 virus infection 2. Anxiety state        Attestations:    Nay Galvez NP    Please note that this dictation was completed with [employer], the computer voice recognition software. Quite often unanticipated grammatical, syntax, homophones, and other interpretive errors are inadvertently transcribed by the computer software. Please disregard these errors. Please excuse any errors that have escaped final proofreading. Thank you.

## 2021-10-03 NOTE — ED TRIAGE NOTES
Pos for covid on 9/24. C/o chest tightness and shortness of breath and cough. resp even and unlabored at triage.

## 2021-10-04 ENCOUNTER — PATIENT OUTREACH (OUTPATIENT)
Dept: CASE MANAGEMENT | Age: 26
End: 2021-10-04

## 2021-10-04 NOTE — PROGRESS NOTES
Patient contacted regarding COVID-19 diagnosis. Discussed COVID-19 related testing which was available at this time. Test results were positive. Patient informed of results, if available? yes. Ambulatory Care Manager contacted the patient by telephone to perform post discharge assessment. Call within 2 business days of discharge: Yes Verified name and  with patient as identifiers. Provided introduction to self, and explanation of the CTN/ACM role, and reason for call due to risk factors for infection and/or exposure to COVID-19. Symptoms reviewed with patient who verbalized the following symptoms: no new symptoms and no worsening symptoms      Due to no new or worsening symptoms encounter was not routed to provider for escalation. Discussed follow-up appointments. If no appointment was previously scheduled, appointment scheduling offered:  no. [address] follow up appointment(s):   Future Appointments   Date Time Provider Harriet Israel   2021 11:30 AM MD ORESTES [facility]     Non-[facility] follow up appointment(s): patient will self schedule PCP appointment    Interventions to address risk factors: Education of patient/family/caregiver/guardian to support self-management-covid-19     Advance Care Planning:   Does patient have an Advance Directive: not on file. Educated patient about risk for severe COVID-19 due to risk factors according to CDC guidelines. ACM reviewed discharge instructions, medical action plan and red flag symptoms with the patient who verbalized understanding. Discussed COVID vaccination status: no. Education provided on COVID-19 vaccination as appropriate. Discussed exposure protocols and quarantine with CDC Guidelines. Patient was given an opportunity to verbalize any questions and concerns and agrees to contact ACM or health care provider for questions related to their healthcare.     Reviewed and educated patient on any new and changed medications related to discharge diagnosis     Was patient discharged with a pulse oximeter? no Discussed and confirmed pulse oximeter discharge instructions and when to notify provider or seek emergency care. ACM provided contact information. Plan for follow-up call in 5-7 days based on severity of symptoms and risk factors.

## 2021-10-11 ENCOUNTER — PATIENT OUTREACH (OUTPATIENT)
Dept: CASE MANAGEMENT | Age: 26
End: 2021-10-11

## 2021-10-11 NOTE — PROGRESS NOTES
Patient resolved from [address] Transitions episode on 10/11/21. Patient/family has been provided the following resources and education related to COVID-19:                         Signs, symptoms and red flags related to COVID-19            CDC exposure and quarantine guidelines            [employer] exposure contact - [phone]            Contact for their local Department of Health                 Patient currently reports that all symptoms have improved    No further outreach scheduled with this CTN/ACM/LPN/HC/ MA. Episode of Care resolved. Patient has this CTN/ACM/LPN/HC/MA contact information if future needs arise.

## 2021-11-05 ENCOUNTER — OFFICE VISIT (OUTPATIENT)
Dept: OBGYN CLINIC | Age: 26
End: 2021-11-05
Payer: MEDICAID

## 2021-11-05 VITALS
OXYGEN SATURATION: 98 % | RESPIRATION RATE: 16 BRPM | BODY MASS INDEX: 24.41 KG/M2 | HEART RATE: 94 BPM | WEIGHT: 143 LBS | SYSTOLIC BLOOD PRESSURE: 111 MMHG | HEIGHT: 64 IN | DIASTOLIC BLOOD PRESSURE: 81 MMHG

## 2021-11-05 DIAGNOSIS — N76.0 VAGINITIS AND VULVOVAGINITIS: ICD-10-CM

## 2021-11-05 DIAGNOSIS — Z01.419 GYNECOLOGIC EXAM NORMAL: Primary | ICD-10-CM

## 2021-11-05 DIAGNOSIS — Z12.4 ENCOUNTER FOR SCREENING FOR MALIGNANT NEOPLASM OF CERVIX: ICD-10-CM

## 2021-11-05 PROCEDURE — 99395 PREV VISIT EST AGE 18-39: CPT | Performed by: OBSTETRICS & GYNECOLOGY

## 2021-11-05 RX ORDER — FLUCONAZOLE 150 MG/1
150 TABLET ORAL DAILY
Qty: 1 TABLET | Refills: 0 | Status: SHIPPED | OUTPATIENT
Start: 2021-11-05 | End: 2021-11-06

## 2021-11-05 NOTE — PATIENT INSTRUCTIONS
Breast Self-Exam: Care Instructions Your Care Instructions A breast self-exam is when you check your breasts for lumps or changes. This regular exam helps you learn how your breasts normally look and feel. Most breast problems or changes are not because of cancer. Breast self-exam is not a substitute for a mammogram. Having regular breast exams by your doctor and regular mammograms improve your chances of finding any problems with your breasts. Some women set a time each month to do a step-by-step breast self-exam. Other women like a less formal system. They might look at their breasts as they brush their teeth, or feel their breasts once in a while in the shower. If you notice a change in your breast, tell your doctor. Follow-up care is a key part of your treatment and safety. Be sure to make and go to all appointments, and call your doctor if you are having problems. It's also a good idea to know your test results and keep a list of the medicines you take. How do you do a breast self-exam? 
· The best time to examine your breasts is usually one week after your menstrual period begins. Your breasts should not be tender then. If you do not have periods, you might do your exam on a day of the month that is easy to remember. · To examine your breasts: ? Remove all your clothes above the waist and lie down. When you are lying down, your breast tissue spreads evenly over your chest wall, which makes it easier to feel all your breast tissue. ? Use the padsnot the fingertipsof the 3 middle fingers of your left hand to check your right breast. Move your fingers slowly in small coin-sized circles that overlap. ? Use three levels of pressure to feel of all your breast tissue. Use light pressure to feel the tissue close to the skin surface. Use medium pressure to feel a little deeper. Use firm pressure to feel your tissue close to your breastbone and ribs.  Use each pressure level to feel your breast tissue before moving on to the next spot. ? Check your entire breast, moving up and down as if following a strip from the collarbone to the bra line, and from the armpit to the ribs. Repeat until you have covered the entire breast. 
? Repeat this procedure for your left breast, using the pads of the 3 middle fingers of your right hand. · To examine your breasts while in the shower: 
? Place one arm over your head and lightly soap your breast on that side. ? Using the pads of your fingers, gently move your hand over your breast (in the strip pattern described above), feeling carefully for any lumps or changes. ? Repeat for the other breast. 
· Have your doctor inspect anything you notice to see if you need further testing. Where can you learn more? Go to http://www.gray.com/ Enter P148 in the search box to learn more about \"Breast Self-Exam: Care Instructions. \" Current as of: December 17, 2020               Content Version: 13.0 © 2006-2021 [employer]. Care instructions adapted under license by [employer] (which disclaims liability or warranty for this information). If you have questions about a medical condition or this instruction, always ask your healthcare professional. [place] [address] any warranty or liability for your use of this information. Vaginal Yeast Infection: Care Instructions Overview A vaginal yeast infection is the growth of too many yeast cells in the vagina. This is common in women of all ages. Itching, vaginal discharge and irritation, and other symptoms can bother you. But yeast infections don't often cause other health problems. Some medicines can increase your risk of getting a yeast infection. These include antibiotics, birth control pills, hormones, and steroids. You may also be more likely to get a yeast infection if you are pregnant, have diabetes, douche, or wear tight clothes.  
With treatment, most yeast infections get better in 2 to 3 days. Follow-up care is a key part of your treatment and safety. Be sure to make and go to all appointments, and call your doctor if you are having problems. It's also a good idea to know your test results and keep a list of the medicines you take. How can you care for yourself at home? · Take your medicines exactly as prescribed. Call your doctor if you think you are having a problem with your medicine. · Ask your doctor about over-the-counter (OTC) medicines for yeast infections. They may cost less than prescription medicines. If you use an OTC treatment, read and follow all instructions on the label. · Don't use tampons while using a vaginal cream or suppository. The tampons can absorb the medicine. Use pads instead. · Wear loose cotton clothing. Don't wear nylon or other fabric that holds body heat and moisture close to the skin. · Try sleeping without underwear. · Don't scratch. Relieve itching with a cold pack or a cool bath. · Don't wash your vaginal area more than once a day. Use plain water or a mild, unscented soap. Air-dry the vaginal area. · Change out of wet swimsuits after swimming. · If you are using a vaginal medicine, don't have sex until you have finished your treatment. But if you do have sex, don't depend on a condom or diaphragm for birth control. The oil in some vaginal medicines weakens latex. · Don't douche. When should you call for help? Call your doctor now or seek immediate medical care if: 
  · You have unexpected vaginal bleeding.  
  · You have new or increased pain in your vagina or pelvis. Watch closely for changes in your health, and be sure to contact your doctor if: 
  · You have a fever.  
  · You are not getting better after 2 days.  
  · Your symptoms come back after you finish your medicines. Where can you learn more? Go to http://www.gray.com/ Enter L723 in the search box to learn more about \"Vaginal Yeast Infection: Care Instructions. \" Current as of: February 11, 2021               Content Version: 13.0 © 2006-2021 [employer]. Care instructions adapted under license by [employer] (which disclaims liability or warranty for this information). If you have questions about a medical condition or this instruction, always ask your healthcare professional. [address] any warranty or liability for your use of this information. Breast Self-Exam: Care Instructions Your Care Instructions A breast self-exam is when you check your breasts for lumps or changes. This regular exam helps you learn how your breasts normally look and feel. Most breast problems or changes are not because of cancer. Breast self-exam is not a substitute for a mammogram. Having regular breast exams by your doctor and regular mammograms improve your chances of finding any problems with your breasts. Some women set a time each month to do a step-by-step breast self-exam. Other women like a less formal system. They might look at their breasts as they brush their teeth, or feel their breasts once in a while in the shower. If you notice a change in your breast, tell your doctor. Follow-up care is a key part of your treatment and safety. Be sure to make and go to all appointments, and call your doctor if you are having problems. It's also a good idea to know your test results and keep a list of the medicines you take. How do you do a breast self-exam? 
· The best time to examine your breasts is usually one week after your menstrual period begins. Your breasts should not be tender then. If you do not have periods, you might do your exam on a day of the month that is easy to remember. · To examine your breasts: ? Remove all your clothes above the waist and lie down.  When you are lying down, your breast tissue spreads evenly over your chest wall, which makes it easier to feel all your breast tissue. ? Use the padsnot the fingertipsof the 3 middle fingers of your left hand to check your right breast. Move your fingers slowly in small coin-sized circles that overlap. ? Use three levels of pressure to feel of all your breast tissue. Use light pressure to feel the tissue close to the skin surface. Use medium pressure to feel a little deeper. Use firm pressure to feel your tissue close to your breastbone and ribs. Use each pressure level to feel your breast tissue before moving on to the next spot. ? Check your entire breast, moving up and down as if following a strip from the collarbone to the bra line, and from the armpit to the ribs. Repeat until you have covered the entire breast. 
? Repeat this procedure for your left breast, using the pads of the 3 middle fingers of your right hand. · To examine your breasts while in the shower: 
? Place one arm over your head and lightly soap your breast on that side. ? Using the pads of your fingers, gently move your hand over your breast (in the strip pattern described above), feeling carefully for any lumps or changes. ? Repeat for the other breast. 
· Have your doctor inspect anything you notice to see if you need further testing. Where can you learn more? Go to http://www.gray.com/ Enter P148 in the search box to learn more about \"Breast Self-Exam: Care Instructions. \" Current as of: December 17, 2020               Content Version: 13.0 © 4266-3277 [employer]. Care instructions adapted under license by [employer] (which disclaims liability or warranty for this information). If you have questions about a medical condition or this instruction, always ask your healthcare professional. [place] [address] any warranty or liability for your use of this information.

## 2021-11-07 NOTE — PROGRESS NOTES
Ashley Rapp is a 23 y.o. female, , No LMP recorded. Patient has had an injection. , who presents today for the following:  Chief Complaint   Patient presents with    Annual Exam        No Known Allergies    Current Outpatient Medications   Medication Sig    medroxyPROGESTERone (DEPO-PROVERA) 150 mg/mL injection INJECT 1 ML INTRAMUSCULAR EVERY 3 MONTHS.  albuterol (Ventolin HFA) 90 mcg/actuation inhaler Take 2 Puffs by inhalation every four (4) hours as needed for Wheezing. (Patient not taking: Reported on 2021)    ondansetron (Zofran ODT) 4 mg disintegrating tablet Take 1 Tablet by mouth every eight (8) hours as needed for Nausea or Vomiting. (Patient not taking: Reported on 2021)    fluticasone propionate (FLONASE) 50 mcg/actuation nasal spray SPRAY 2 SPRAYS INTO EACH NOSTRIL DAILY (Patient not taking: Reported on 2021)    montelukast (SINGULAIR) 10 mg tablet TAKE 1 TABLET BY MOUTH EVERY DAY (Patient not taking: Reported on 2021)    fluconazole (DIFLUCAN) 150 mg tablet Take 150 mg by mouth daily. FDA advises cautious prescribing of oral fluconazole in pregnancy. (Patient not taking: Reported on 2021)    azithromycin (ZITHROMAX) 250 mg tablet Take 250 mg by mouth daily. (Patient not taking: Reported on 2021)    ciprofloxacin HCl (CIPRO) 500 mg tablet Take  by mouth two (2) times a day. (Patient not taking: Reported on 2021)    ibuprofen (MOTRIN) 800 mg tablet Take  by mouth. (Patient not taking: Reported on 2021)    loratadine (CLARITIN) 10 mg tablet Take 10 mg by mouth. (Patient not taking: Reported on 2021)    phenazopyridine (PYRIDIUM) 200 mg tablet Take  by mouth three (3) times daily as needed for Pain. (Patient not taking: Reported on 2021)     No current facility-administered medications for this visit.        Past Medical History:   Diagnosis Date    Amenorrhea 10/26/2020    Family history of diabetes mellitus 10/26/2020    Migraines     Miscarriage 10/26/2020    Seizure disorder ([facility] [address] 10/26/2020       Past Surgical History:   Procedure Laterality Date    HX  SECTION      HX TONSILLECTOMY         Family History   Problem Relation Age of Onset    Diabetes Maternal Grandmother        Social History     Socioeconomic History    Marital status: SINGLE     Spouse name: Not on file    Number of children: Not on file    Years of education: Not on file    Highest education level: Not on file   Occupational History    Not on file   Tobacco Use    Smoking status: Never Smoker    Smokeless tobacco: Never Used   Substance and Sexual Activity    Alcohol use: Never    Drug use: Never    Sexual activity: Yes     Partners: Male     Birth control/protection: Injection   Other Topics Concern    Not on file   Social History Narrative    Not on file     Social Determinants of Health     Financial Resource Strain:     Difficulty of Paying Living Expenses: Not on file   Food Insecurity:     Worried About Running Out of Food in the Last Year: Not on file    Kika of Food in the Last Year: Not on file   Transportation Needs:     Lack of Transportation (Medical): Not on file    Lack of Transportation (Non-Medical):  Not on file   Physical Activity:     Days of Exercise per Week: Not on file    Minutes of Exercise per Session: Not on file   Stress:     Feeling of Stress : Not on file   Social Connections:     Frequency of Communication with Friends and Family: Not on file    Frequency of Social Gatherings with Friends and Family: Not on file    Attends Mandaen Services: Not on file    Active Member of Clubs or Organizations: Not on file    Attends Club or Organization Meetings: Not on file    Marital Status: Not on file   Intimate Partner Violence:     Fear of Current or Ex-Partner: Not on file    Emotionally Abused: Not on file    Physically Abused: Not on file    Sexually Abused: Not on file   Housing Stability:     Unable to Pay for Housing in the Last Year: Not on file    Number of Places Lived in the Last Year: Not on file    Unstable Housing in the Last Year: Not on file         HPI  Annual  Currently on  Depo    Review of Systems   Constitutional: Negative. Respiratory: Negative. Cardiovascular: Negative. Gastrointestinal: Negative. Genitourinary: Negative. Musculoskeletal: Negative. Skin: Negative. Neurological: Negative. Endo/Heme/Allergies: Negative. Psychiatric/Behavioral: Negative. All other systems reviewed and are negative. /81 (BP 1 Location: Right arm, BP Patient Position: Sitting)   Pulse 94   Resp 16   Ht 5' 4\" (1.626 m)   Wt 143 lb (64.9 kg)   SpO2 98%   BMI 24.55 kg/m²    OBGyn Exam   Constitutional:      General Appearance: healthy-appearing, well-nourished, and well-developed; Level of Distress: NAD. Ambulation: ambulating normally. Psychiatric:   Insight: good judgement. Mental Status: normal mood and affect and active and alert. Orientation: to time, place, and person. Memory: recent memory normal and remote memory normal.     Head: Head: normocephalic and atraumatic. Neck:   Neck: supple, FROM, trachea midline, and no masses. Lymph Nodes: no cervical LAD, supraclavicular LAD, axillary LAD, or inguinal LAD. Thyroid: no enlargement or nodules and non-tender. Lungs:   Respiratory effort: no dyspnea. Cardiovascular:   .   Pulses including femoral / pedal: normal throughout. Breast: Breast: no masses or abnormal secretions and normal appearance. Abdomen:   : no tenderness, guarding, masses, rebound tenderness, or CVA tenderness and non-distended. Female :   External genitalia: no lesions or rash and normal.   Vagina: moist mucosa;  discharge. Cervix: no discharge, inflammation, or cervical motion tenderness   Uterus: midline, smooth, and non-tender;    Adnexae: no adnexal mass or tenderness and size WNL.    Bladder and Urethra: normal bladder and urethra (except where noted). 1. Gynecologic exam normal    - PAP IG, RFX APTIMA HPV ASCUS ([id number])    2. Encounter for screening for malignant neoplasm of cervix      3. Vaginitis and vulvovaginitis    - fluconazole (DIFLUCAN) 150 mg tablet; Take 1 Tablet by mouth daily for 1 day. FDA advises cautious prescribing of oral fluconazole in pregnancy. Dispense: 1 Tablet;  Refill: 0        Follow-up and Dispositions    · Return in about 1 year (around 11/5/2022) for annual.

## 2021-11-08 LAB
CYTOLOGIST CVX/VAG CYTO: NORMAL
CYTOLOGY CVX/VAG DOC CYTO: NORMAL
CYTOLOGY CVX/VAG DOC THIN PREP: NORMAL
DX ICD CODE: NORMAL
LABCORP, 190119: NORMAL
Lab: NORMAL
OTHER STN SPEC: NORMAL
STAT OF ADQ CVX/VAG CYTO-IMP: NORMAL

## 2022-01-04 ENCOUNTER — HOSPITAL ENCOUNTER (EMERGENCY)
Age: 27
Discharge: HOME | End: 2022-01-04
Attending: EMERGENCY MEDICINE
Payer: MEDICAID

## 2022-01-04 VITALS
DIASTOLIC BLOOD PRESSURE: 71 MMHG | OXYGEN SATURATION: 98 % | TEMPERATURE: 101.4 F | HEART RATE: 100 BPM | SYSTOLIC BLOOD PRESSURE: 110 MMHG | RESPIRATION RATE: 18 BRPM

## 2022-01-04 DIAGNOSIS — U07.1 COVID-19: Primary | ICD-10-CM

## 2022-01-04 LAB
FLUAV RNA SPEC QL NAA+PROBE: NOT DETECTED
FLUBV RNA SPEC QL NAA+PROBE: NOT DETECTED
SARS-COV-2, COV2: DETECTED

## 2022-01-04 PROCEDURE — 87636 SARSCOV2 & INF A&B AMP PRB: CPT

## 2022-01-04 PROCEDURE — 74011250637 HC RX REV CODE- 250/637: Performed by: EMERGENCY MEDICINE

## 2022-01-04 PROCEDURE — 99283 EMERGENCY DEPT VISIT LOW MDM: CPT

## 2022-01-04 RX ORDER — IBUPROFEN 600 MG/1
600 TABLET ORAL
Status: COMPLETED | OUTPATIENT
Start: 2022-01-04 | End: 2022-01-04

## 2022-01-04 RX ADMIN — IBUPROFEN 600 MG: 600 TABLET ORAL at 10:39

## 2022-01-04 NOTE — ED PROVIDER NOTES
HPI   Patient reports that she contracted Covid last year and is still not regained her sense of taste and smell. She reports return of similar symptoms over the last several days including fever, headache, rhinorrhea, malaise, arthralgias myalgias. Over-the-counter medication has not been helpful. Denies GI/ complaints. Past Medical History:   Diagnosis Date    Amenorrhea 10/26/2020    Family history of diabetes mellitus 10/26/2020    Migraines     Miscarriage 10/26/2020    Seizure disorder ([address] 10/26/2020       Past Surgical History:   Procedure Laterality Date    HX  SECTION      HX TONSILLECTOMY           Family History:   Problem Relation Age of Onset    Diabetes Maternal Grandmother        Social History     Socioeconomic History    Marital status: SINGLE     Spouse name: Not on file    Number of children: Not on file    Years of education: Not on file    Highest education level: Not on file   Occupational History    Not on file   Tobacco Use    Smoking status: Never Smoker    Smokeless tobacco: Never Used   Substance and Sexual Activity    Alcohol use: Never    Drug use: Never    Sexual activity: Yes     Partners: Male     Birth control/protection: Injection   Other Topics Concern    Not on file   Social History Narrative    Not on file     Social Determinants of Health     Financial Resource Strain:     Difficulty of Paying Living Expenses: Not on file   Food Insecurity:     Worried About Running Out of Food in the Last Year: Not on file    Kika of Food in the Last Year: Not on file   Transportation Needs:     Lack of Transportation (Medical): Not on file    Lack of Transportation (Non-Medical):  Not on file   Physical Activity:     Days of Exercise per Week: Not on file    Minutes of Exercise per Session: Not on file   Stress:     Feeling of Stress : Not on file   Social Connections:     Frequency of Communication with Friends and Family: Not on file    Frequency of Social Gatherings with Friends and Family: Not on file    Attends Holiness Services: Not on file    Active Member of Clubs or Organizations: Not on file    Attends Club or Organization Meetings: Not on file    Marital Status: Not on file   Intimate Partner Violence:     Fear of Current or Ex-Partner: Not on file    Emotionally Abused: Not on file    Physically Abused: Not on file    Sexually Abused: Not on file   Housing Stability:     Unable to Pay for Housing in the Last Year: Not on file    Number of [place] in the Last Year: Not on file    Unstable Housing in the Last Year: Not on file         ALLERGIES: Patient has no known allergies. Review of Systems   Constitutional: Positive for activity change, chills, diaphoresis, fatigue and fever. HENT: Positive for congestion. Eyes: Negative. Respiratory: Negative. Cardiovascular: Negative. Gastrointestinal: Negative. Endocrine: Negative. Genitourinary: Negative. Musculoskeletal: Positive for arthralgias and myalgias. Allergic/Immunologic: Negative. Neurological: Negative. Hematological: Negative. Psychiatric/Behavioral: Negative. All other systems reviewed and are negative. Vitals:    01/04/22 1012   BP: 110/71   Pulse: (!) 112   Temp: (!) 100.7 °F (38.2 °C)   SpO2: 98%            Physical Exam  Vitals and nursing note reviewed. Constitutional:       General: She is not in acute distress. Appearance: Normal appearance. She is normal weight. She is not ill-appearing, toxic-appearing or diaphoretic. HENT:      Head: Normocephalic and atraumatic. Nose: Congestion and rhinorrhea present. Mouth/Throat:      Mouth: Mucous membranes are moist.      Pharynx: Oropharynx is clear. Eyes:      Conjunctiva/sclera: Conjunctivae normal.      Pupils: Pupils are equal, round, and reactive to light. Pulmonary:      Effort: Pulmonary effort is normal. No respiratory distress.       Breath sounds: Normal breath sounds. No stridor. No wheezing, rhonchi or rales. Chest:      Chest wall: No tenderness. Abdominal:      Tenderness: There is no right CVA tenderness or left CVA tenderness. Musculoskeletal:         General: No swelling, tenderness, deformity or signs of injury. Normal range of motion. Cervical back: Normal range of motion. No rigidity. Right lower leg: No edema. Left lower leg: No edema. Skin:     General: Skin is warm and dry. Capillary Refill: Capillary refill takes less than 2 seconds. Coloration: Skin is not jaundiced or pale. Findings: No bruising, erythema, lesion or rash. Neurological:      General: No focal deficit present. Mental Status: She is alert and oriented to person, place, and time. Mental status is at baseline. Cranial Nerves: No cranial nerve deficit. Sensory: No sensory deficit. Motor: No weakness. Coordination: Coordination normal.      Gait: Gait normal.   Psychiatric:         Mood and Affect: Mood normal.         Behavior: Behavior normal.          MDM     Presentation is pathognomonic for a viral syndrome-Covid and/or influenza. Given abnormal vital signs will check swabs, provide symptom relief, okay for discharge.   Procedures

## 2022-01-04 NOTE — ED NOTES
Work/School Note    Date: 1/4/2022     To Whom It May concern:    Randolph Later was evaluated by the following provider(s):  Attending Provider: Amando Larkin MD.   Abdulkadir Smith virus is positive. Per the CDC guidelines we recommend home isolation until the following conditions are all met:    1. At least five days have passed since symptoms first appeared and/or had a close exposure,   2. After home isolation for five days, wearing a mask around others for the next five days,  3. At least 24 have passed since last fever without the use of fever-reducing medications and  4.  Symptoms (eg cough, shortness of breath) have improved      Sincerely,        Dr Seth Iyer

## 2022-01-04 NOTE — ED TRIAGE NOTES
Patient complaining of headache, body aches, fever, and cough. Patient states she had Covid in September.

## 2022-03-19 PROBLEM — O03.9 MISCARRIAGE: Status: ACTIVE | Noted: 2020-10-26

## 2022-03-19 PROBLEM — J37.0 CHRONIC LARYNGITIS: Status: ACTIVE | Noted: 2020-10-07

## 2022-03-19 PROBLEM — N91.2 AMENORRHEA: Status: ACTIVE | Noted: 2020-10-26

## 2022-03-20 PROBLEM — G40.909 SEIZURE DISORDER (HCC): Status: ACTIVE | Noted: 2020-10-26

## 2022-03-20 PROBLEM — Z83.3 FAMILY HISTORY OF DIABETES MELLITUS: Status: ACTIVE | Noted: 2020-10-26

## 2022-05-17 DIAGNOSIS — N94.89 SUPPRESSION OF MENSES: ICD-10-CM

## 2022-05-19 RX ORDER — MEDROXYPROGESTERONE ACETATE 150 MG/ML
INJECTION, SUSPENSION INTRAMUSCULAR
Qty: 1 ML | Refills: 1 | Status: SHIPPED | OUTPATIENT
Start: 2022-05-19

## 2022-05-23 ENCOUNTER — TELEPHONE (OUTPATIENT)
Dept: OBGYN CLINIC | Age: 27
End: 2022-05-23

## 2022-05-23 NOTE — TELEPHONE ENCOUNTER
Patient called stating she was seen by a headache specialist who recommends she stop the Depo Provera for 1 month to determine if that may be the cause of her headaches. Advised her the note would be made in her chart.

## 2022-09-27 ENCOUNTER — APPOINTMENT (OUTPATIENT)
Dept: ULTRASOUND IMAGING | Age: 27
End: 2022-09-27
Attending: NURSE PRACTITIONER
Payer: MEDICAID

## 2022-09-27 ENCOUNTER — HOSPITAL ENCOUNTER (EMERGENCY)
Age: 27
Discharge: HOME | End: 2022-09-27
Attending: EMERGENCY MEDICINE
Payer: MEDICAID

## 2022-09-27 VITALS
HEART RATE: 80 BPM | BODY MASS INDEX: 24.41 KG/M2 | HEIGHT: 64 IN | DIASTOLIC BLOOD PRESSURE: 76 MMHG | OXYGEN SATURATION: 99 % | WEIGHT: 143 LBS | RESPIRATION RATE: 18 BRPM | SYSTOLIC BLOOD PRESSURE: 114 MMHG | TEMPERATURE: 98 F

## 2022-09-27 DIAGNOSIS — N93.9 VAGINAL BLEEDING: Primary | ICD-10-CM

## 2022-09-27 LAB
ABO + RH BLD: NORMAL
ALBUMIN SERPL-MCNC: 3.8 G/DL (ref 3.5–5)
ALBUMIN/GLOB SERPL: 1.1 {RATIO} (ref 1.1–2.2)
ALP SERPL-CCNC: 75 U/L (ref 45–117)
ALT SERPL-CCNC: 17 U/L (ref 12–78)
ANION GAP SERPL CALC-SCNC: 3 MMOL/L (ref 5–15)
APPEARANCE UR: ABNORMAL
AST SERPL W P-5'-P-CCNC: 14 U/L (ref 15–37)
BACTERIA URNS QL MICRO: NEGATIVE /HPF
BASOPHILS # BLD: 0 K/UL (ref 0–0.1)
BASOPHILS NFR BLD: 0 % (ref 0–1)
BILIRUB SERPL-MCNC: 1 MG/DL (ref 0.2–1)
BILIRUB UR QL: NEGATIVE
BLOOD GROUP ANTIBODIES SERPL: NEGATIVE
BUN SERPL-MCNC: 12 MG/DL (ref 6–20)
BUN/CREAT SERPL: 15 (ref 12–20)
CA-I BLD-MCNC: 9.1 MG/DL (ref 8.5–10.1)
CHLORIDE SERPL-SCNC: 109 MMOL/L (ref 97–108)
CO2 SERPL-SCNC: 28 MMOL/L (ref 21–32)
COLOR UR: ABNORMAL
CREAT SERPL-MCNC: 0.8 MG/DL (ref 0.55–1.02)
DIFFERENTIAL METHOD BLD: NORMAL
EOSINOPHIL # BLD: 0.1 K/UL (ref 0–0.4)
EOSINOPHIL NFR BLD: 2 % (ref 0–7)
ERYTHROCYTE [DISTWIDTH] IN BLOOD BY AUTOMATED COUNT: 12.1 % (ref 11.5–14.5)
GLOBULIN SER CALC-MCNC: 3.5 G/DL (ref 2–4)
GLUCOSE SERPL-MCNC: 91 MG/DL (ref 65–100)
GLUCOSE UR STRIP.AUTO-MCNC: NEGATIVE MG/DL
HCG SERPL QL: NEGATIVE
HCT VFR BLD AUTO: 35.4 % (ref 35–47)
HGB BLD-MCNC: 11.8 G/DL (ref 11.5–16)
HGB UR QL STRIP: ABNORMAL
IMM GRANULOCYTES # BLD AUTO: 0 K/UL (ref 0–0.04)
IMM GRANULOCYTES NFR BLD AUTO: 0 % (ref 0–0.5)
KETONES UR QL STRIP.AUTO: NEGATIVE MG/DL
LEUKOCYTE ESTERASE UR QL STRIP.AUTO: ABNORMAL
LYMPHOCYTES # BLD: 1.3 K/UL (ref 0.8–3.5)
LYMPHOCYTES NFR BLD: 31 % (ref 12–49)
MCH RBC QN AUTO: 29.6 PG (ref 26–34)
MCHC RBC AUTO-ENTMCNC: 33.3 G/DL (ref 30–36.5)
MCV RBC AUTO: 88.7 FL (ref 80–99)
MONOCYTES # BLD: 0.4 K/UL (ref 0–1)
MONOCYTES NFR BLD: 10 % (ref 5–13)
MUCOUS THREADS URNS QL MICRO: ABNORMAL /LPF
NEUTS SEG # BLD: 2.4 K/UL (ref 1.8–8)
NEUTS SEG NFR BLD: 57 % (ref 32–75)
NITRITE UR QL STRIP.AUTO: NEGATIVE
NRBC # BLD: 0 K/UL (ref 0–0.01)
NRBC BLD-RTO: 0 PER 100 WBC
PH UR STRIP: 7 [PH] (ref 5–8)
PLATELET # BLD AUTO: 298 K/UL (ref 150–400)
PMV BLD AUTO: 10.3 FL (ref 8.9–12.9)
POTASSIUM SERPL-SCNC: 3.5 MMOL/L (ref 3.5–5.1)
PROT SERPL-MCNC: 7.3 G/DL (ref 6.4–8.2)
PROT UR STRIP-MCNC: 30 MG/DL
RBC # BLD AUTO: 3.99 M/UL (ref 3.8–5.2)
RBC #/AREA URNS HPF: >100 /HPF (ref 0–5)
SODIUM SERPL-SCNC: 140 MMOL/L (ref 136–145)
SP GR UR REFRACTOMETRY: 1.01 (ref 1–1.03)
SPECIMEN EXP DATE BLD: NORMAL
UA: UC IF INDICATED,UAUC: ABNORMAL
UROBILINOGEN UR QL STRIP.AUTO: 2 EU/DL (ref 0.1–1)
WBC # BLD AUTO: 4.2 K/UL (ref 3.6–11)
WBC URNS QL MICRO: ABNORMAL /HPF (ref 0–4)

## 2022-09-27 PROCEDURE — 81001 URINALYSIS AUTO W/SCOPE: CPT

## 2022-09-27 PROCEDURE — 87086 URINE CULTURE/COLONY COUNT: CPT

## 2022-09-27 PROCEDURE — 36415 COLL VENOUS BLD VENIPUNCTURE: CPT

## 2022-09-27 PROCEDURE — 99284 EMERGENCY DEPT VISIT MOD MDM: CPT

## 2022-09-27 PROCEDURE — 85025 COMPLETE CBC W/AUTO DIFF WBC: CPT

## 2022-09-27 PROCEDURE — 86900 BLOOD TYPING SEROLOGIC ABO: CPT

## 2022-09-27 PROCEDURE — 84703 CHORIONIC GONADOTROPIN ASSAY: CPT

## 2022-09-27 PROCEDURE — 76830 TRANSVAGINAL US NON-OB: CPT

## 2022-09-27 PROCEDURE — 80053 COMPREHEN METABOLIC PANEL: CPT

## 2022-09-27 NOTE — ED PROVIDER NOTES
EMERGENCY DEPARTMENT HISTORY AND PHYSICAL EXAM      Date: 2022  Patient Name: Liz Gandara    History of Presenting Illness     Chief Complaint   Patient presents with    Vaginal Bleeding       History Provided By: Patient    HPI: Liz Gandara, 33 y.o. female past medical history of migraines and seizure disorder who presents to the ER with vaginal bleeding. Patient recently stopped her Depo due to migraines. Patient concerned she is having a miscarriage. Patient has not missed a cycle but is on her current cycle early. Patient reports this cycle started yesterday and she has had to change pads every 2 hours. Moderate severity, no known exacerbating or relieving factors, no other associated signs and symptoms     There are no other complaints, changes, or physical findings at this time. PCP: Alon Dutton NP    No current facility-administered medications on file prior to encounter. Current Outpatient Medications on File Prior to Encounter   Medication Sig Dispense Refill    medroxyPROGESTERone (DEPO-PROVERA) 150 mg/mL injection INJECT 1 ML INTRAMUSCULAR EVERY 3 MONTHS. 1 mL 1       Past History     Past Medical History:  Past Medical History:   Diagnosis Date    Amenorrhea 10/26/2020    Family history of diabetes mellitus 10/26/2020    Migraines     Miscarriage 10/26/2020    Seizure disorder ([facility] [address] 10/26/2020       Past Surgical History:  Past Surgical History:   Procedure Laterality Date    HX  SECTION      HX TONSILLECTOMY         Family History:  Family History   Problem Relation Age of Onset    Diabetes Maternal Grandmother        Social History:  Social History     Tobacco Use    Smoking status: Never    Smokeless tobacco: Never   Substance Use Topics    Alcohol use: Never    Drug use: Never       Allergies:  No Known Allergies    Review of Systems   Review of Systems   Constitutional:  Negative for chills, fatigue and fever.    HENT:  Negative for congestion, sinus pressure and trouble swallowing. Eyes:  Negative for photophobia and pain. Respiratory:  Negative for cough and shortness of breath. Cardiovascular:  Negative for chest pain and leg swelling. Gastrointestinal:  Negative for abdominal pain, diarrhea, nausea and vomiting. Endocrine: Negative for polydipsia, polyphagia and polyuria. Genitourinary:  Positive for vaginal bleeding. Negative for decreased urine volume, difficulty urinating, dysuria, hematuria and urgency. Musculoskeletal:  Negative for back pain, gait problem, myalgias and neck pain. Skin:  Negative for pallor and rash. Allergic/Immunologic: Negative for environmental allergies and food allergies. Neurological:  Negative for dizziness, facial asymmetry, speech difficulty, numbness and headaches. Hematological:  Negative for adenopathy. Does not bruise/bleed easily. Psychiatric/Behavioral:  Negative for agitation, self-injury and suicidal ideas. The patient is not nervous/anxious. Physical Exam   Physical Exam  Vitals and nursing note reviewed. Constitutional:       Appearance: Normal appearance. HENT:      Head: Atraumatic. Right Ear: Tympanic membrane and external ear normal.      Left Ear: Tympanic membrane and external ear normal.      Nose: Nose normal.      Mouth/Throat:      Mouth: Mucous membranes are moist.   Eyes:      Extraocular Movements: Extraocular movements intact. Pupils: Pupils are equal, round, and reactive to light. Cardiovascular:      Rate and Rhythm: Normal rate and regular rhythm. Pulses: Normal pulses. Heart sounds: Normal heart sounds. Pulmonary:      Breath sounds: Normal breath sounds. Abdominal:      General: Abdomen is flat. Palpations: Abdomen is soft. Genitourinary:     Vagina: Bleeding present. Musculoskeletal:         General: Normal range of motion. Cervical back: Normal range of motion and neck supple. Skin:     General: Skin is warm and dry.       Capillary Refill: Capillary refill takes less than 2 seconds. Neurological:      General: No focal deficit present. Mental Status: She is alert and oriented to person, place, and time. Mental status is at baseline. Psychiatric:         Mood and Affect: Mood normal.         Behavior: Behavior normal.       Lab and Diagnostic Study Results   Labs -     Recent Results (from the past 12 hour(s))   METABOLIC PANEL, COMPREHENSIVE    Collection Time: 09/27/22  9:42 AM   Result Value Ref Range    Sodium 140 136 - 145 mmol/L    Potassium 3.5 3.5 - 5.1 mmol/L    Chloride 109 (H) 97 - 108 mmol/L    CO2 28 21 - 32 mmol/L    Anion gap 3 (L) 5 - 15 mmol/L    Glucose 91 65 - 100 mg/dL    BUN 12 6 - 20 mg/dL    Creatinine 0.80 0.55 - 1.02 mg/dL    BUN/Creatinine ratio 15 12 - 20      GFR est AA >60 >60 ml/min/1.73m2    GFR est non-AA >60 >60 ml/min/1.73m2    Calcium 9.1 8.5 - 10.1 mg/dL    Bilirubin, total 1.0 0.2 - 1.0 mg/dL    AST (SGOT) 14 (L) 15 - 37 U/L    ALT (SGPT) 17 12 - 78 U/L    Alk. phosphatase 75 45 - 117 U/L    Protein, total 7.3 6.4 - 8.2 g/dL    Albumin 3.8 3.5 - 5.0 g/dL    Globulin 3.5 2.0 - 4.0 g/dL    A-G Ratio 1.1 1.1 - 2.2     CBC WITH AUTOMATED DIFF    Collection Time: 09/27/22  9:42 AM   Result Value Ref Range    WBC 4.2 3.6 - 11.0 K/uL    RBC 3.99 3.80 - 5.20 M/uL    HGB 11.8 11.5 - 16.0 g/dL    HCT 35.4 35.0 - 47.0 %    MCV 88.7 80.0 - 99.0 FL    MCH 29.6 26.0 - 34.0 PG    MCHC 33.3 30.0 - 36.5 g/dL    RDW 12.1 11.5 - 14.5 %    PLATELET 975 590 - 426 K/uL    MPV 10.3 8.9 - 12.9 FL    NRBC 0.0 0.0  WBC    ABSOLUTE NRBC 0.00 0.00 - 0.01 K/uL    NEUTROPHILS 57 32 - 75 %    LYMPHOCYTES 31 12 - 49 %    MONOCYTES 10 5 - 13 %    EOSINOPHILS 2 0 - 7 %    BASOPHILS 0 0 - 1 %    IMMATURE GRANULOCYTES 0 0 - 0.5 %    ABS. NEUTROPHILS 2.4 1.8 - 8.0 K/UL    ABS. LYMPHOCYTES 1.3 0.8 - 3.5 K/UL    ABS. MONOCYTES 0.4 0.0 - 1.0 K/UL    ABS. EOSINOPHILS 0.1 0.0 - 0.4 K/UL    ABS. BASOPHILS 0.0 0.0 - 0.1 K/UL    ABS. BRAD Eastman. 0.0 0.00 - 0.04 K/UL    DF AUTOMATED     TYPE & SCREEN    Collection Time: 09/27/22  9:42 AM   Result Value Ref Range    Crossmatch Expiration 09/30/2022,2359     ABO/Rh(D) B Positive     Antibody screen Negative    HCG QL SERUM    Collection Time: 09/27/22  9:42 AM   Result Value Ref Range    HCG, Ql. Negative Negative     URINALYSIS W/ REFLEX CULTURE    Collection Time: 09/27/22  9:42 AM    Specimen: Urine   Result Value Ref Range    Color Yellow/Straw      Appearance Turbid (A) Clear      Specific gravity 1.014 1.003 - 1.030      pH (UA) 7.0 5.0 - 8.0      Protein 30 (A) Negative mg/dL    Glucose Negative Negative mg/dL    Ketone Negative Negative mg/dL    Bilirubin Negative Negative      Blood Large (A) Negative      Urobilinogen 2.0 (H) 0.1 - 1.0 EU/dL    Nitrites Negative Negative      Leukocyte Esterase Trace (A) Negative      UA:UC IF INDICATED Urine Culture Ordered (A) Culture not indicated by UA result      WBC 20-50 0 - 4 /hpf    RBC >100 (H) 0 - 5 /hpf    Bacteria Negative Negative /hpf    Mucus Trace /lpf       Radiologic Studies -   @lastxrresult@  CT Results  (Last 48 hours)      None          CXR Results  (Last 48 hours)      None            Medical Decision Making and ED Course   Differential Diagnosis & Medical Decision Making Provider Note:   Patient presents with lower abdominal pain and vaginal bleeding. DDx: pregnancy, ectopic, ovarian torsion, cyst, endometriosis, fibroid, PCOS, dysmenorrhea/menorrhagia. Will obtain urinalysis, urine pregnancy, perform pelvic and send pelvic labs. Will reassess need for further imaging.     - I am the first provider for this patient. I reviewed the vital signs, available nursing notes, past medical history, past surgical history, family history and social history. The patients presenting problems have been discussed, and they are in agreement with the care plan formulated and outlined with them.   I have encouraged them to ask questions as they arise throughout their visit. Vital Signs-Reviewed the patient's vital signs. Patient Vitals for the past 12 hrs:   Temp Pulse Resp BP SpO2   09/27/22 1257 -- 80 18 114/76 99 %   09/27/22 0912 98 °F (36.7 °C) 86 16 136/83 100 %       ED Course:          Procedures   Performed by: Lilian Flores NP  Procedures    Disposition   Disposition: DC- Adult Discharges: All of the diagnostic tests were reviewed and questions answered. Diagnosis, care plan and treatment options were discussed. The patient understands the instructions and will follow up as directed. The patients results have been reviewed with them. They have been counseled regarding their diagnosis. The patient verbally convey understanding and agreement of the signs, symptoms, diagnosis, treatment and prognosis and additionally agrees to follow up as recommended with their PCP in 24 - 48 hours. They also agree with the care-plan and convey that all of their questions have been answered. I have also put together some discharge instructions for them that include: 1) educational information regarding their diagnosis, 2) how to care for their diagnosis at home, as well a 3) list of reasons why they would want to return to the ED prior to their follow-up appointment, should their condition change. DISCHARGE PLAN:  1. Current Discharge Medication List        CONTINUE these medications which have NOT CHANGED    Details   medroxyPROGESTERone (DEPO-PROVERA) 150 mg/mL injection INJECT 1 ML INTRAMUSCULAR EVERY 3 MONTHS. Qty: 1 mL, Refills: 1    Associated Diagnoses: Suppression of menses           2. Follow-up Information       Follow up With Specialties Details Why Contact Nadine Rodney NP Nurse Practitioner   [address] [place] [address]  [phone]            3. Return to ED if worse   4. Discharge Medication List as of 9/27/2022 12:36 PM            Diagnosis/Clinical Impression     Clinical Impression:   1.  Vaginal bleeding Attestations: Annabel Prieto NP, am the primary clinician of record. Please note that this dictation was completed with [employer], the computer voice recognition software. Quite often unanticipated grammatical, syntax, homophones, and other interpretive errors are inadvertently transcribed by the computer software. Please disregard these errors. Please excuse any errors that have escaped final proofreading. Thank you.

## 2022-09-28 LAB
BACTERIA SPEC CULT: NORMAL
SPECIAL REQUESTS,SREQ: NORMAL

## 2022-12-31 ENCOUNTER — HOSPITAL ENCOUNTER (EMERGENCY)
Age: 27
Discharge: HOME | End: 2022-12-31
Attending: EMERGENCY MEDICINE
Payer: MEDICAID

## 2022-12-31 VITALS
SYSTOLIC BLOOD PRESSURE: 105 MMHG | WEIGHT: 137 LBS | RESPIRATION RATE: 18 BRPM | HEART RATE: 74 BPM | BODY MASS INDEX: 23.39 KG/M2 | TEMPERATURE: 97.9 F | HEIGHT: 64 IN | DIASTOLIC BLOOD PRESSURE: 64 MMHG | OXYGEN SATURATION: 100 %

## 2022-12-31 DIAGNOSIS — T14.8XXA MUSCLE STRAIN: ICD-10-CM

## 2022-12-31 DIAGNOSIS — S09.90XA CLOSED HEAD INJURY, INITIAL ENCOUNTER: ICD-10-CM
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
## 2022-12-31 PROCEDURE — 74011250637 HC RX REV CODE- 250/637: Performed by: EMERGENCY MEDICINE

## 2022-12-31 PROCEDURE — 99283 EMERGENCY DEPT VISIT LOW MDM: CPT

## 2022-12-31 RX ORDER — ACETAMINOPHEN 325 MG/1
650 TABLET ORAL
Qty: 20 TABLET | Refills: 0 | Status: SHIPPED | OUTPATIENT
Start: 2022-12-31

## 2022-12-31 RX ORDER — IBUPROFEN 800 MG/1
800 TABLET ORAL ONCE
Status: COMPLETED | OUTPATIENT
Start: 2022-12-31 | End: 2022-12-31

## 2022-12-31 RX ORDER — ACETAMINOPHEN 500 MG
1000 TABLET ORAL ONCE
Status: COMPLETED | OUTPATIENT
Start: 2022-12-31 | End: 2022-12-31

## 2022-12-31 RX ORDER — LIDOCAINE 50 MG/G
PATCH TOPICAL
Qty: 15 EACH | Refills: 0 | Status: SHIPPED | OUTPATIENT
Start: 2022-12-31

## 2022-12-31 RX ORDER — METHOCARBAMOL 750 MG/1
750 TABLET, FILM COATED ORAL
Qty: 15 TABLET | Refills: 0 | Status: SHIPPED | OUTPATIENT
Start: 2022-12-31 | End: 2023-01-14

## 2022-12-31 RX ORDER — IBUPROFEN 600 MG/1
600 TABLET ORAL
Qty: 20 TABLET | Refills: 0 | Status: SHIPPED | OUTPATIENT
Start: 2022-12-31

## 2022-12-31 RX ADMIN — IBUPROFEN 800 MG: 800 TABLET, FILM COATED ORAL at 14:31

## 2022-12-31 RX ADMIN — ACETAMINOPHEN 1000 MG: 500 TABLET ORAL at 14:31
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
## 2022-12-31 NOTE — Clinical Note
[address]  [facility] EMERGENCY DEPT  [address] [phone]  [phone]    Work/School Note    Date: 12/31/2022    To Whom It May concern:    Whitney Mancini was seen and treated today in the emergency room by the following provider(s):  Attending Provider: Letty Edwards MD.      Whitney Mancini is excused from work/school on 12/31/2022 through 1/2/2023. She is medically clear to return to work/school on 1/3/2023.          Sincerely,          Ayan Sylvester MD
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
## 2023-05-22 RX ORDER — IBUPROFEN 600 MG/1
600 TABLET ORAL EVERY 6 HOURS PRN
COMMUNITY
Start: 2022-12-31

## 2023-05-22 RX ORDER — ACETAMINOPHEN 325 MG/1
650 TABLET ORAL EVERY 4 HOURS PRN
COMMUNITY
Start: 2022-12-31

## 2023-05-22 RX ORDER — LIDOCAINE 50 MG/G
PATCH TOPICAL
COMMUNITY
Start: 2022-12-31

## 2023-05-22 RX ORDER — MEDROXYPROGESTERONE ACETATE 150 MG/ML
INJECTION, SUSPENSION INTRAMUSCULAR
COMMUNITY
Start: 2022-05-19

## 2023-06-23 ENCOUNTER — HOSPITAL ENCOUNTER (EMERGENCY)
Facility: HOSPITAL | Age: 28
Discharge: HOME | End: 2023-06-24
Attending: EMERGENCY MEDICINE
Payer: COMMERCIAL

## 2023-06-23 VITALS
SYSTOLIC BLOOD PRESSURE: 116 MMHG | DIASTOLIC BLOOD PRESSURE: 79 MMHG | WEIGHT: 132.8 LBS | HEIGHT: 64 IN | TEMPERATURE: 98.1 F | BODY MASS INDEX: 22.67 KG/M2 | OXYGEN SATURATION: 99 % | HEART RATE: 70 BPM | RESPIRATION RATE: 19 BRPM

## 2023-06-23 DIAGNOSIS — R42 DIZZINESS: Primary | ICD-10-CM

## 2023-06-23 DIAGNOSIS — E87.6 HYPOKALEMIA: ICD-10-CM

## 2023-06-23 LAB
ANION GAP SERPL CALC-SCNC: 8 MMOL/L (ref 5–15)
BASOPHILS # BLD: 0 K/UL (ref 0–0.1)
BASOPHILS NFR BLD: 0 % (ref 0–1)
BUN SERPL-MCNC: 13 MG/DL (ref 6–20)
BUN/CREAT SERPL: 15 (ref 12–20)
CA-I BLD-MCNC: 9.1 MG/DL (ref 8.5–10.1)
CHLORIDE SERPL-SCNC: 106 MMOL/L (ref 97–108)
CO2 SERPL-SCNC: 27 MMOL/L (ref 21–32)
CREAT SERPL-MCNC: 0.89 MG/DL (ref 0.55–1.02)
DIFFERENTIAL METHOD BLD: ABNORMAL
EOSINOPHIL # BLD: 0 K/UL (ref 0–0.4)
EOSINOPHIL NFR BLD: 0 % (ref 0–7)
ERYTHROCYTE [DISTWIDTH] IN BLOOD BY AUTOMATED COUNT: 12.2 % (ref 11.5–14.5)
GLUCOSE SERPL-MCNC: 106 MG/DL (ref 65–100)
HCT VFR BLD AUTO: 33.3 % (ref 35–47)
HGB BLD-MCNC: 11.1 G/DL (ref 11.5–16)
IMM GRANULOCYTES # BLD AUTO: 0 K/UL (ref 0–0.04)
IMM GRANULOCYTES NFR BLD AUTO: 0 % (ref 0–0.5)
LYMPHOCYTES # BLD: 1.3 K/UL (ref 0.8–3.5)
LYMPHOCYTES NFR BLD: 24 % (ref 12–49)
MCH RBC QN AUTO: 29.8 PG (ref 26–34)
MCHC RBC AUTO-ENTMCNC: 33.3 G/DL (ref 30–36.5)
MCV RBC AUTO: 89.3 FL (ref 80–99)
MONOCYTES # BLD: 0.4 K/UL (ref 0–1)
MONOCYTES NFR BLD: 7 % (ref 5–13)
NEUTS SEG # BLD: 3.7 K/UL (ref 1.8–8)
NEUTS SEG NFR BLD: 69 % (ref 32–75)
NRBC # BLD: 0 K/UL (ref 0–0.01)
NRBC BLD-RTO: 0 PER 100 WBC
PLATELET # BLD AUTO: 265 K/UL (ref 150–400)
PMV BLD AUTO: 10.3 FL (ref 8.9–12.9)
POTASSIUM SERPL-SCNC: 3.1 MMOL/L (ref 3.5–5.1)
RBC # BLD AUTO: 3.73 M/UL (ref 3.8–5.2)
SODIUM SERPL-SCNC: 141 MMOL/L (ref 136–145)
WBC # BLD AUTO: 5.4 K/UL (ref 3.6–11)

## 2023-06-23 PROCEDURE — 85025 COMPLETE CBC W/AUTO DIFF WBC: CPT

## 2023-06-23 PROCEDURE — 36415 COLL VENOUS BLD VENIPUNCTURE: CPT

## 2023-06-23 PROCEDURE — 80048 BASIC METABOLIC PNL TOTAL CA: CPT

## 2023-06-23 PROCEDURE — 99283 EMERGENCY DEPT VISIT LOW MDM: CPT

## 2023-06-23 ASSESSMENT — ENCOUNTER SYMPTOMS
EYES NEGATIVE: 1
ABDOMINAL PAIN: 0
CHEST TIGHTNESS: 0
GASTROINTESTINAL NEGATIVE: 1
RESPIRATORY NEGATIVE: 1
ALLERGIC/IMMUNOLOGIC NEGATIVE: 1
SHORTNESS OF BREATH: 0

## 2023-06-23 ASSESSMENT — PAIN SCALES - GENERAL: PAINLEVEL_OUTOF10: 3

## 2023-06-23 ASSESSMENT — PAIN - FUNCTIONAL ASSESSMENT: PAIN_FUNCTIONAL_ASSESSMENT: 0-10

## 2023-06-24 PROCEDURE — 6370000000 HC RX 637 (ALT 250 FOR IP): Performed by: EMERGENCY MEDICINE

## 2023-06-24 RX ORDER — POTASSIUM CHLORIDE 20 MEQ/1
40 TABLET, EXTENDED RELEASE ORAL
Status: COMPLETED | OUTPATIENT
Start: 2023-06-24 | End: 2023-06-24

## 2023-06-24 RX ADMIN — POTASSIUM CHLORIDE 40 MEQ: 1500 TABLET, EXTENDED RELEASE ORAL at 00:05

## 2023-06-24 NOTE — ED TRIAGE NOTES
Pt states that she felt lightheaded at home shortly before she arrived. She denies actually passing out but states that she hasn't eaten and thinks it could be due to that. Pt states she was about to eat but got lightheaded and came to be evaluated. Pt states that she is also having a headache.

## 2023-06-24 NOTE — ED PROVIDER NOTES
HPI  32 yo female presents with complaint of dizziness and lightheaded feeling . No LOC . She is on her period and denies any other associated signs or symptoms. Review of Systems   Constitutional: Negative. Negative for chills and fever. HENT: Negative. Eyes: Negative. Respiratory: Negative. Negative for chest tightness and shortness of breath. Cardiovascular: Negative. Gastrointestinal: Negative. Negative for abdominal pain. Endocrine: Negative. Genitourinary:  Positive for vaginal bleeding. Musculoskeletal: Negative. Allergic/Immunologic: Negative. Neurological:  Positive for dizziness. Hematological: Negative. Psychiatric/Behavioral: Negative. Physical Exam  Vitals and nursing note reviewed. HENT:      Head: Normocephalic and atraumatic. Nose: Nose normal.   Eyes:      Extraocular Movements: Extraocular movements intact. Pupils: Pupils are equal, round, and reactive to light. Cardiovascular:      Rate and Rhythm: Normal rate and regular rhythm. Pulmonary:      Breath sounds: Normal breath sounds. Abdominal:      General: Abdomen is flat. Bowel sounds are normal.      Palpations: Abdomen is soft. Musculoskeletal:         General: Normal range of motion. Cervical back: Normal range of motion and neck supple. Skin:     General: Skin is warm. Neurological:      General: No focal deficit present. Mental Status: She is alert.          Tunde Ch MD  06/23/23 1711

## 2023-06-24 NOTE — ED NOTES
Patient stable at time of discharge. Education and discharge paperwork is reviewed with patient who verbalizes understanding of same. Patient ambulates from ED with all belongings.       Flower Beltran RN  06/24/23 0010

## 2023-07-14 ENCOUNTER — TELEPHONE (OUTPATIENT)
Age: 28
End: 2023-07-14

## 2023-07-14 NOTE — TELEPHONE ENCOUNTER
Patient needs to get an appointment for her procedure and the nexplanon ordered.  [facility] in [address] [place] [address] [phone]

## 2023-08-24 ENCOUNTER — OFFICE VISIT (OUTPATIENT)
Age: 28
End: 2023-08-24
Payer: COMMERCIAL

## 2023-08-24 VITALS
DIASTOLIC BLOOD PRESSURE: 70 MMHG | HEART RATE: 74 BPM | WEIGHT: 136 LBS | OXYGEN SATURATION: 98 % | SYSTOLIC BLOOD PRESSURE: 112 MMHG | HEIGHT: 64 IN | RESPIRATION RATE: 16 BRPM | BODY MASS INDEX: 23.22 KG/M2

## 2023-08-24 DIAGNOSIS — Z01.419 ENCOUNTER FOR ANNUAL ROUTINE GYNECOLOGICAL EXAMINATION: Primary | ICD-10-CM

## 2023-08-24 DIAGNOSIS — Z12.4 ENCOUNTER FOR SCREENING FOR MALIGNANT NEOPLASM OF CERVIX: ICD-10-CM

## 2023-08-24 DIAGNOSIS — N89.8 VAGINAL DISCHARGE: ICD-10-CM

## 2023-08-24 PROCEDURE — 99395 PREV VISIT EST AGE 18-39: CPT | Performed by: OBSTETRICS & GYNECOLOGY

## 2023-08-24 RX ORDER — FLUCONAZOLE 150 MG/1
150 TABLET ORAL ONCE
Qty: 1 TABLET | Refills: 0 | Status: SHIPPED | OUTPATIENT
Start: 2023-08-24 | End: 2023-08-24

## 2023-08-24 NOTE — PATIENT INSTRUCTIONS
sure to make and go to all appointments, and call your doctor if you are having problems. It's also a good idea to know your test results and keep a list of the medicines you take. How can you care for yourself at home? Take your medicines exactly as prescribed. Call your doctor if you think you are having a problem with your medicine. Ask your doctor about over-the-counter (OTC) medicines for yeast infections. If you use an OTC treatment, read and follow all instructions on the label. Don't use tampons while using a vaginal cream or suppository. The tampons can absorb the medicine. Use pads instead. Wear loose cotton clothing. Don't wear nylon or other fabric that holds body heat and moisture close to the skin. Try sleeping without underwear. Don't scratch. Relieve itching with a cold pack or a cool bath. Don't wash your vulva more than once a day. Use plain water or a mild, unscented soap. Air-dry the vulva. Change out of wet or damp clothes as soon as possible. If you are using a vaginal medicine, don't have sex until you have finished your treatment. But if you do have sex, don't depend on a condom or diaphragm for birth control. The oil in some vaginal medicines weakens latex. Don't douche or use powders, sprays, or perfumes in your vagina or on your vulva. These items can change the normal balance of organisms in your vagina. When should you call for help? Call your doctor now or seek immediate medical care if:    You have new or increased pain in your vagina or pelvis. Watch closely for changes in your health, and be sure to contact your doctor if:    You have unexpected vaginal bleeding. You have a fever. You are not getting better after 2 days. Your symptoms come back after you finish your medicines. Where can you learn more? Go to http://www.woods.com/ and enter T718 to learn more about \"Vaginal Yeast Infection: Care Instructions. \"  Current as of: August 2,

## 2023-08-30 LAB
C TRACH RRNA CVX QL NAA+PROBE: NEGATIVE
CYTOLOGIST CVX/VAG CYTO: NORMAL
CYTOLOGY CVX/VAG DOC CYTO: NORMAL
CYTOLOGY CVX/VAG DOC THIN PREP: NORMAL
DX ICD CODE: NORMAL
HPV GENOTYPE REFLEX: NORMAL
HPV I/H RISK 4 DNA CVX QL PROBE+SIG AMP: NEGATIVE
Lab: NORMAL
N GONORRHOEA RRNA CVX QL NAA+PROBE: NEGATIVE
OTHER STN SPEC: NORMAL
STAT OF ADQ CVX/VAG CYTO-IMP: NORMAL
T VAGINALIS RRNA SPEC QL NAA+PROBE: NEGATIVE

## 2023-12-11 ENCOUNTER — TELEPHONE (OUTPATIENT)
Age: 28
End: 2023-12-11

## 2023-12-11 ENCOUNTER — HOSPITAL ENCOUNTER (EMERGENCY)
Facility: HOSPITAL | Age: 28
Discharge: HOME | End: 2023-12-11

## 2023-12-11 NOTE — TELEPHONE ENCOUNTER
Patient called to schedule an appointment due to a creamy white vaginal discharge without an associated itching. Advised her Dr Gracia Gonzalez will not be able to see her until 12/13/23 at 9:30 and she stated she will call back.

## 2024-03-09 ENCOUNTER — HOSPITAL ENCOUNTER (EMERGENCY)
Facility: HOSPITAL | Age: 29
Discharge: HOME | End: 2024-03-09
Attending: EMERGENCY MEDICINE
Payer: MEDICAID

## 2024-03-09 VITALS
DIASTOLIC BLOOD PRESSURE: 95 MMHG | SYSTOLIC BLOOD PRESSURE: 130 MMHG | HEART RATE: 78 BPM | RESPIRATION RATE: 19 BRPM | TEMPERATURE: 98 F | OXYGEN SATURATION: 98 %

## 2024-03-09 DIAGNOSIS — N30.01 ACUTE CYSTITIS WITH HEMATURIA: Primary | ICD-10-CM

## 2024-03-09 LAB
APPEARANCE UR: CLEAR
BACTERIA URNS QL MICRO: ABNORMAL /HPF
BILIRUB UR QL: NEGATIVE
COLOR UR: ABNORMAL
GLUCOSE UR STRIP.AUTO-MCNC: NEGATIVE MG/DL
HGB UR QL STRIP: ABNORMAL
KETONES UR QL STRIP.AUTO: NEGATIVE MG/DL
LEUKOCYTE ESTERASE UR QL STRIP.AUTO: NEGATIVE
NITRITE UR QL STRIP.AUTO: NEGATIVE
PH UR STRIP: 6 (ref 5–8)
PROT UR STRIP-MCNC: 30 MG/DL
RBC #/AREA URNS HPF: ABNORMAL /HPF (ref 0–3)
SP GR UR REFRACTOMETRY: 1.02 (ref 1–1.03)
URINE CULTURE IF INDICATED: ABNORMAL
UROBILINOGEN UR QL STRIP.AUTO: 1 EU/DL (ref 0.2–1)
WBC URNS QL MICRO: ABNORMAL /HPF (ref 0–5)

## 2024-03-09 PROCEDURE — 87086 URINE CULTURE/COLONY COUNT: CPT

## 2024-03-09 PROCEDURE — 6370000000 HC RX 637 (ALT 250 FOR IP): Performed by: EMERGENCY MEDICINE

## 2024-03-09 PROCEDURE — 87147 CULTURE TYPE IMMUNOLOGIC: CPT

## 2024-03-09 PROCEDURE — 81001 URINALYSIS AUTO W/SCOPE: CPT

## 2024-03-09 PROCEDURE — 99283 EMERGENCY DEPT VISIT LOW MDM: CPT

## 2024-03-09 RX ORDER — PHENAZOPYRIDINE HYDROCHLORIDE 100 MG/1
200 TABLET, FILM COATED ORAL
Status: COMPLETED | OUTPATIENT
Start: 2024-03-09 | End: 2024-03-09

## 2024-03-09 RX ORDER — PHENAZOPYRIDINE HYDROCHLORIDE 100 MG/1
100 TABLET, FILM COATED ORAL 3 TIMES DAILY PRN
Qty: 9 TABLET | Refills: 0 | Status: SHIPPED | OUTPATIENT
Start: 2024-03-09 | End: 2024-03-12

## 2024-03-09 RX ORDER — CEPHALEXIN 250 MG/1
500 CAPSULE ORAL
Status: COMPLETED | OUTPATIENT
Start: 2024-03-09 | End: 2024-03-09

## 2024-03-09 RX ORDER — CEPHALEXIN 500 MG/1
500 CAPSULE ORAL 2 TIMES DAILY
Qty: 6 CAPSULE | Refills: 0 | Status: SHIPPED | OUTPATIENT
Start: 2024-03-09 | End: 2024-03-12

## 2024-03-09 RX ADMIN — PHENAZOPYRIDINE 200 MG: 100 TABLET ORAL at 18:55

## 2024-03-09 RX ADMIN — CEPHALEXIN 500 MG: 250 CAPSULE ORAL at 18:55

## 2024-03-09 ASSESSMENT — PAIN - FUNCTIONAL ASSESSMENT: PAIN_FUNCTIONAL_ASSESSMENT: 0-10

## 2024-03-09 NOTE — ED PROVIDER NOTES
[facility] EMERGENCY DEPT  EMERGENCY DEPARTMENT HISTORY AND PHYSICAL EXAM      Date: 3/9/2024  Patient Name: Em Holt  MRN: [id number]  YOB: 1995  Date of evaluation: 3/9/2024  Provider: Basilia Patel MD   Note Started: 6:47 PM EST 3/9/24    HISTORY OF PRESENT ILLNESS     Chief Complaint   Patient presents with    Abdominal Pain       History Provided By: Patient    HPI: Em Holt is a 28 y.o. female     PAST MEDICAL HISTORY   Past Medical History:  Past Medical History:   Diagnosis Date    Amenorrhea 10/26/2020    Family history of diabetes mellitus 10/26/2020    Migraines     Miscarriage 10/26/2020    Seizure disorder (HCC) 10/26/2020       Past Surgical History:  Past Surgical History:   Procedure Laterality Date     SECTION      TONSILLECTOMY         Family History:  Family History   Problem Relation Age of Onset    Diabetes Maternal Grandmother        Social History:  Social History     Tobacco Use    Smoking status: Never    Smokeless tobacco: Never   Substance Use Topics    Alcohol use: Never    Drug use: Never       Allergies:  No Known Allergies    PCP: Audrey Aguiar APRN - NP    Current Meds:   No current facility-administered medications for this encounter.     Current Outpatient Medications   Medication Sig Dispense Refill    acetaminophen (TYLENOL) 325 MG tablet Take 2 tablets by mouth every 4 hours as needed (Patient not taking: Reported on 2023)      ibuprofen (ADVIL;MOTRIN) 600 MG tablet Take 1 tablet by mouth every 6 hours as needed      lidocaine (LIDODERM) 5 % Apply patch to the affected area for 12 hours a day and remove for 12 hours a day. (Patient not taking: Reported on 2023)      medroxyPROGESTERone (DEPO-PROVERA) 150 MG/ML injection INJECT 1 ML INTRAMUSCULAR EVERY 3 MONTHS. (Patient not taking: Reported on 2023)         Social Determinants of Health:   Social Determinants of Health     Tobacco Use: Low Risk  (2023)    Patient History

## 2024-03-11 LAB
BACTERIA SPEC CULT: ABNORMAL
BACTERIA SPEC CULT: ABNORMAL
COLONY COUNT, CNT: ABNORMAL
COLONY COUNT, CNT: ABNORMAL
Lab: ABNORMAL

## 2024-06-13 ENCOUNTER — APPOINTMENT (OUTPATIENT)
Facility: HOSPITAL | Age: 29
End: 2024-06-13
Payer: MEDICAID

## 2024-06-13 ENCOUNTER — HOSPITAL ENCOUNTER (EMERGENCY)
Facility: HOSPITAL | Age: 29
Discharge: HOME | End: 2024-06-13
Attending: STUDENT IN AN ORGANIZED HEALTH CARE EDUCATION/TRAINING PROGRAM
Payer: MEDICAID

## 2024-06-13 VITALS
WEIGHT: 132 LBS | BODY MASS INDEX: 22.53 KG/M2 | TEMPERATURE: 98 F | OXYGEN SATURATION: 100 % | HEIGHT: 64 IN | HEART RATE: 90 BPM | SYSTOLIC BLOOD PRESSURE: 135 MMHG | DIASTOLIC BLOOD PRESSURE: 74 MMHG | RESPIRATION RATE: 20 BRPM
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
## 2024-06-13 DIAGNOSIS — S01.81XA FOREHEAD LACERATION, INITIAL ENCOUNTER: ICD-10-CM

## 2024-06-13 LAB
ANION GAP SERPL CALC-SCNC: 12 MMOL/L (ref 5–15)
BASOPHILS # BLD: 0 K/UL (ref 0–0.1)
BASOPHILS NFR BLD: 0 % (ref 0–1)
BUN SERPL-MCNC: 18 MG/DL (ref 6–20)
BUN/CREAT SERPL: 19 (ref 12–20)
CA-I BLD-MCNC: 8.9 MG/DL (ref 8.5–10.1)
CHLORIDE SERPL-SCNC: 104 MMOL/L (ref 97–108)
CO2 SERPL-SCNC: 23 MMOL/L (ref 21–32)
CREAT SERPL-MCNC: 0.93 MG/DL (ref 0.55–1.02)
DIFFERENTIAL METHOD BLD: ABNORMAL
EOSINOPHIL # BLD: 0 K/UL (ref 0–0.4)
EOSINOPHIL NFR BLD: 1 % (ref 0–7)
ERYTHROCYTE [DISTWIDTH] IN BLOOD BY AUTOMATED COUNT: 12.5 % (ref 11.5–14.5)
GLUCOSE SERPL-MCNC: 113 MG/DL (ref 65–100)
HCG SERPL QL: NEGATIVE
HCT VFR BLD AUTO: 34.3 % (ref 35–47)
HGB BLD-MCNC: 11.4 G/DL (ref 11.5–16)
IMM GRANULOCYTES # BLD AUTO: 0 K/UL (ref 0–0.04)
IMM GRANULOCYTES NFR BLD AUTO: 0 % (ref 0–0.5)
LYMPHOCYTES # BLD: 2.1 K/UL (ref 0.8–3.5)
LYMPHOCYTES NFR BLD: 43 % (ref 12–49)
MCH RBC QN AUTO: 29.9 PG (ref 26–34)
MCHC RBC AUTO-ENTMCNC: 33.2 G/DL (ref 30–36.5)
MCV RBC AUTO: 90 FL (ref 80–99)
MONOCYTES # BLD: 0.5 K/UL (ref 0–1)
MONOCYTES NFR BLD: 11 % (ref 5–13)
NEUTS SEG # BLD: 2.2 K/UL (ref 1.8–8)
NEUTS SEG NFR BLD: 45 % (ref 32–75)
NRBC # BLD: 0 K/UL (ref 0–0.01)
NRBC BLD-RTO: 0 PER 100 WBC
PLATELET # BLD AUTO: 227 K/UL (ref 150–400)
PMV BLD AUTO: 9.8 FL (ref 8.9–12.9)
POTASSIUM SERPL-SCNC: 3.5 MMOL/L (ref 3.5–5.1)
RBC # BLD AUTO: 3.81 M/UL (ref 3.8–5.2)
SODIUM SERPL-SCNC: 139 MMOL/L (ref 136–145)
WBC # BLD AUTO: 4.8 K/UL (ref 3.6–11)

## 2024-06-13 PROCEDURE — 6360000002 HC RX W HCPCS: Performed by: STUDENT IN AN ORGANIZED HEALTH CARE EDUCATION/TRAINING PROGRAM

## 2024-06-13 PROCEDURE — 12014 RPR F/E/E/N/L/M 5.1-7.5 CM: CPT

## 2024-06-13 PROCEDURE — 80048 BASIC METABOLIC PNL TOTAL CA: CPT

## 2024-06-13 PROCEDURE — 85025 COMPLETE CBC W/AUTO DIFF WBC: CPT

## 2024-06-13 PROCEDURE — 12002 RPR S/N/AX/GEN/TRNK2.6-7.5CM: CPT

## 2024-06-13 PROCEDURE — 2500000003 HC RX 250 WO HCPCS: Performed by: STUDENT IN AN ORGANIZED HEALTH CARE EDUCATION/TRAINING PROGRAM

## 2024-06-13 PROCEDURE — 84703 CHORIONIC GONADOTROPIN ASSAY: CPT

## 2024-06-13 PROCEDURE — 72125 CT NECK SPINE W/O DYE: CPT

## 2024-06-13 PROCEDURE — 96374 THER/PROPH/DIAG INJ IV PUSH: CPT

## 2024-06-13 PROCEDURE — 70450 CT HEAD/BRAIN W/O DYE: CPT

## 2024-06-13 PROCEDURE — 36415 COLL VENOUS BLD VENIPUNCTURE: CPT

## 2024-06-13 PROCEDURE — 71045 X-RAY EXAM CHEST 1 VIEW: CPT

## 2024-06-13 PROCEDURE — 99284 EMERGENCY DEPT VISIT MOD MDM: CPT

## 2024-06-13 PROCEDURE — 73562 X-RAY EXAM OF KNEE 3: CPT

## 2024-06-13 RX ORDER — KETOROLAC TROMETHAMINE 15 MG/ML
15 INJECTION, SOLUTION INTRAMUSCULAR; INTRAVENOUS ONCE
Status: COMPLETED | OUTPATIENT
Start: 2024-06-13 | End: 2024-06-13

## 2024-06-13 RX ORDER — KETOROLAC TROMETHAMINE 15 MG/ML
15 INJECTION, SOLUTION INTRAMUSCULAR; INTRAVENOUS ONCE
Status: DISCONTINUED | OUTPATIENT
Start: 2024-06-13 | End: 2024-06-13 | Stop reason: HOSPADM

## 2024-06-13 RX ORDER — LIDOCAINE HYDROCHLORIDE 10 MG/ML
20 INJECTION, SOLUTION EPIDURAL; INFILTRATION; INTRACAUDAL; PERINEURAL
Status: COMPLETED | OUTPATIENT
Start: 2024-06-13 | End: 2024-06-13

## 2024-06-13 RX ADMIN — KETOROLAC TROMETHAMINE 15 MG: 15 INJECTION, SOLUTION INTRAMUSCULAR; INTRAVENOUS at 19:01

## 2024-06-13 RX ADMIN — LIDOCAINE HYDROCHLORIDE 20 ML: 10 INJECTION, SOLUTION EPIDURAL; INFILTRATION; INTRACAUDAL; PERINEURAL at 18:59

## 2024-06-13 ASSESSMENT — LIFESTYLE VARIABLES
HOW MANY STANDARD DRINKS CONTAINING ALCOHOL DO YOU HAVE ON A TYPICAL DAY: PATIENT DOES NOT DRINK
HOW OFTEN DO YOU HAVE A DRINK CONTAINING ALCOHOL: NEVER

## 2024-06-13 ASSESSMENT — PAIN - FUNCTIONAL ASSESSMENT
PAIN_FUNCTIONAL_ASSESSMENT: 0-10
PAIN_FUNCTIONAL_ASSESSMENT: 0-10

## 2024-06-13 ASSESSMENT — PAIN SCALES - GENERAL
PAINLEVEL_OUTOF10: 0
PAINLEVEL_OUTOF10: 0
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
## 2024-06-13 NOTE — ED PROVIDER NOTES
tenderness to palpation, therefore low concern for fracture.    Administering Toradol for pain, getting basic labs, CT head and C-spine, and chest x-ray.  Will perform E-FAST considering patient is tachycardic.    Records Reviewed (source and summary of external notes): Prior medical records and Nursing notes.    Vitals:    Vitals:    06/13/24 1520 06/13/24 1541 06/13/24 1824 06/13/24 2112   BP:  (!) 144/72  135/74   Pulse:    90   Resp:    20   Temp:    98 °F (36.7 °C)   TempSrc:    Oral   SpO2: 93%  100% 100%   Weight:       Height:            ED COURSE  ED Course as of 06/13/24 2140   Thu Jun 13, 2024 1806 No acute findings on imaging.  Labs unremarkable. [BD]   1818 Bedside E fast negative, HR decreased to 80s [BD]   2101 Procedure Note - Laceration Repair:  9:01 PM EDT   Procedure by Florentin Goldberg MD   Complexity: simple  6 cm and 3 cm linear lacerations to forehead and scalp  was irrigated copiously with NS under jet lavage.  The area was anesthetized via local infiltration of 10 mL Lidocaine 1%.  The wound was explored with the following results: No foreign bodies found.  The wound was repaired with One layer suture closure: Skin Layer:  10 sutures placed, stitch type:simple interrupted, suture: 4-0 nylon for 8 and 5-0 nylon for 2..  The wound was closed with good hemostasis and approximation.   Estimated blood loss: minimal  The procedure took 31-45 minutes, and pt tolerated well  [BD]   2103 Patient continues to be in no acute distress on reassessment.  Lac repair performed.  Discharged home.  Return precautions discussed emergency department. [BD]      ED Course User Index  [BD] Florentin Goldberg MD       SEPSIS Reassessment: Sepsis reassessment not applicable    Clinical Management Tools:  Not Applicable    Patient was given the following medications:  Medications   ketorolac (TORADOL) injection 15 mg (has no administration in time range)   ketorolac (TORADOL) injection 15 mg (15 mg IntraVENous

## 2024-06-13 NOTE — ED NOTES
Pt sitting up with c-collar intact- talking to visitors and on phone. Offered pain med- does not want any at this time. Made pt aware to let nursing staff know if she changes her mind. Pt verbalized understanding

## 2024-06-14 NOTE — ED NOTES
Discharged home to self.  Ambulatory out of ED.  VS WDL.  0 s/s acute distress.  Respirations even and unlabored.  Discharge instructions and follow up care reviewed.  Patient receptive and demonstrated knowledge of instruction via teach-back method.  Patient became agitated stating her work note was not long enough.

## 2024-06-14 NOTE — DISCHARGE INSTRUCTIONS
We advise you to have your sutures removed in 7 to 10 days.  You may go to any healthcare provider for this.  In the meantime, keep clean with soap and water.  You can also apply bacitracin.  Please return the emergency department if you have any worsening symptoms.    Thank you for choosing our Emergency Department for your care.  It is our privilege to care for you in your time of need.  In the next several days, you may receive a survey via email or mailed to your home about your experience with our team.  We would greatly appreciate you taking a few minutes to complete the survey, as we use this information to learn what we have done well and what we could be doing better. Thank you for trusting us with your care!    Below you will find a list of your tests from today's visit.   Labs  Recent Results (from the past 12 hour(s))   BMP    Collection Time: 06/13/24  3:42 PM   Result Value Ref Range    Sodium 139 136 - 145 mmol/L    Potassium 3.5 3.5 - 5.1 mmol/L    Chloride 104 97 - 108 mmol/L    CO2 23 21 - 32 mmol/L    Anion Gap 12 5 - 15 mmol/L    Glucose 113 (H) 65 - 100 mg/dL    BUN 18 6 - 20 mg/dL    Creatinine 0.93 0.55 - 1.02 mg/dL    BUN/Creatinine Ratio 19 12 - 20      Est, Glom Filt Rate 86 >60 ml/min/1.73m2    Calcium 8.9 8.5 - 10.1 mg/dL   CBC with Auto Differential    Collection Time: 06/13/24  3:42 PM   Result Value Ref Range    WBC 4.8 3.6 - 11.0 K/uL    RBC 3.81 3.80 - 5.20 M/uL    Hemoglobin 11.4 (L) 11.5 - 16.0 g/dL    Hematocrit 34.3 (L) 35.0 - 47.0 %    MCV 90.0 80.0 - 99.0 FL    MCH 29.9 26.0 - 34.0 PG    MCHC 33.2 30.0 - 36.5 g/dL    RDW 12.5 11.5 - 14.5 %    Platelets 227 150 - 400 K/uL    MPV 9.8 8.9 - 12.9 FL    Nucleated RBCs 0.0 0.0  WBC    nRBC 0.00 0.00 - 0.01 K/uL    Neutrophils % 45 32 - 75 %    Lymphocytes % 43 12 - 49 %    Monocytes % 11 5 - 13 %    Eosinophils % 1 0 - 7 %    Basophils % 0 0 - 1 %    Immature Granulocytes % 0 0 - 0.5 %    Neutrophils Absolute 2.2 1.8 - 8.0

## 2024-08-21 ENCOUNTER — HOSPITAL ENCOUNTER (EMERGENCY)
Facility: HOSPITAL | Age: 29
Discharge: HOME | End: 2024-08-21
Attending: EMERGENCY MEDICINE
Payer: MEDICAID

## 2024-08-21 VITALS
WEIGHT: 141.3 LBS | SYSTOLIC BLOOD PRESSURE: 129 MMHG | BODY MASS INDEX: 24.12 KG/M2 | DIASTOLIC BLOOD PRESSURE: 87 MMHG | RESPIRATION RATE: 16 BRPM | OXYGEN SATURATION: 100 % | HEIGHT: 64 IN | HEART RATE: 84 BPM | TEMPERATURE: 98.9 F

## 2024-08-21 DIAGNOSIS — U07.1 COVID: Primary | ICD-10-CM

## 2024-08-21 LAB
FLUAV RNA SPEC QL NAA+PROBE: NOT DETECTED
FLUBV RNA SPEC QL NAA+PROBE: NOT DETECTED
SARS-COV-2 RNA RESP QL NAA+PROBE: DETECTED

## 2024-08-21 PROCEDURE — 6370000000 HC RX 637 (ALT 250 FOR IP): Performed by: EMERGENCY MEDICINE

## 2024-08-21 PROCEDURE — 87636 SARSCOV2 & INF A&B AMP PRB: CPT

## 2024-08-21 PROCEDURE — 99283 EMERGENCY DEPT VISIT LOW MDM: CPT

## 2024-08-21 RX ORDER — IBUPROFEN 600 MG/1
600 TABLET ORAL
Status: COMPLETED | OUTPATIENT
Start: 2024-08-21 | End: 2024-08-21

## 2024-08-21 RX ADMIN — IBUPROFEN 600 MG: 600 TABLET, FILM COATED ORAL at 06:10

## 2024-08-21 ASSESSMENT — ENCOUNTER SYMPTOMS
RESPIRATORY NEGATIVE: 1
SORE THROAT: 0
RHINORRHEA: 1
GASTROINTESTINAL NEGATIVE: 1
EYES NEGATIVE: 1

## 2024-08-21 ASSESSMENT — PAIN - FUNCTIONAL ASSESSMENT
PAIN_FUNCTIONAL_ASSESSMENT: ACTIVITIES ARE NOT PREVENTED
PAIN_FUNCTIONAL_ASSESSMENT: 0-10

## 2024-08-21 ASSESSMENT — PAIN DESCRIPTION - PAIN TYPE: TYPE: ACUTE PAIN

## 2024-08-21 ASSESSMENT — PAIN DESCRIPTION - LOCATION: LOCATION: HEAD

## 2024-08-21 ASSESSMENT — PAIN SCALES - GENERAL: PAINLEVEL_OUTOF10: 7

## 2024-08-21 NOTE — ED NOTES
[facility] EMERGENCY DEPT  EMERGENCY DEPARTMENT ENCOUNTER      Pt Name: Em Holt  MRN: [id number]  Birthdate 1995  Date of evaluation: 2024  Provider: Inocente Singleton MD  5:42 AM    CHIEF COMPLAINT       Chief Complaint   Patient presents with    URI         HISTORY OF PRESENT ILLNESS    Em Holt is a 29 y.o. female who presents to the emergency department with complaint of nasal congestion, headache and subjective fever yesterday.    HPI    Nursing Notes were reviewed.    REVIEW OF SYSTEMS       Review of Systems   Constitutional:  Positive for fever.   HENT:  Positive for congestion and rhinorrhea. Negative for ear pain and sore throat.    Eyes: Negative.    Respiratory: Negative.     Cardiovascular: Negative.    Gastrointestinal: Negative.    Musculoskeletal: Negative.    Neurological: Negative.    Hematological: Negative.        Except as noted above the remainder of the review of systems was reviewed and negative.       PAST MEDICAL HISTORY     Past Medical History:   Diagnosis Date    Amenorrhea 10/26/2020    Family history of diabetes mellitus 10/26/2020    Migraines     Miscarriage 10/26/2020    Seizure disorder (HCC) 10/26/2020         SURGICAL HISTORY       Past Surgical History:   Procedure Laterality Date     SECTION      TONSILLECTOMY           CURRENT MEDICATIONS       Previous Medications    ACETAMINOPHEN (TYLENOL) 325 MG TABLET    Take 2 tablets by mouth every 4 hours as needed    IBUPROFEN (ADVIL;MOTRIN) 600 MG TABLET    Take 1 tablet by mouth every 6 hours as needed    LIDOCAINE (LIDODERM) 5 %    Apply patch to the affected area for 12 hours a day and remove for 12 hours a day.    MEDROXYPROGESTERONE (DEPO-PROVERA) 150 MG/ML INJECTION    INJECT 1 ML INTRAMUSCULAR EVERY 3 MONTHS.       ALLERGIES     Patient has no known allergies.    FAMILY HISTORY       Family History   Problem Relation Age of Onset    Diabetes Maternal Grandmother           SOCIAL HISTORY       Social History

## 2024-08-21 NOTE — ED TRIAGE NOTES
Pt reports weakness, congestion, and headache since yesterday morning.  Pt states that last night she felt hot as if she was having a fever.  Pt is afebrile at this time.  Pt denies any N/V/D.

## 2025-05-15 ENCOUNTER — OFFICE VISIT (OUTPATIENT)
Age: 30
End: 2025-05-15
Payer: MEDICAID

## 2025-05-15 VITALS
RESPIRATION RATE: 18 BRPM | BODY MASS INDEX: 23.56 KG/M2 | HEART RATE: 84 BPM | DIASTOLIC BLOOD PRESSURE: 84 MMHG | SYSTOLIC BLOOD PRESSURE: 134 MMHG | WEIGHT: 138 LBS | OXYGEN SATURATION: 100 % | HEIGHT: 64 IN

## 2025-05-15 DIAGNOSIS — Z12.4 ENCOUNTER FOR SCREENING FOR MALIGNANT NEOPLASM OF CERVIX: ICD-10-CM

## 2025-05-15 DIAGNOSIS — Z01.419 ENCOUNTER FOR ANNUAL ROUTINE GYNECOLOGICAL EXAMINATION: Primary | ICD-10-CM

## 2025-05-15 DIAGNOSIS — N89.8 VAGINAL DISCHARGE: ICD-10-CM

## 2025-05-15 PROCEDURE — 99395 PREV VISIT EST AGE 18-39: CPT | Performed by: OBSTETRICS & GYNECOLOGY

## 2025-05-15 RX ORDER — FLUCONAZOLE 150 MG/1
150 TABLET ORAL ONCE
Qty: 1 TABLET | Refills: 0 | Status: SHIPPED | OUTPATIENT
Start: 2025-05-15 | End: 2025-05-15

## 2025-05-15 NOTE — PATIENT INSTRUCTIONS
level to feel your breast tissue before moving on to the next spot.  Check the entire breast area and armpit.  Repeat this procedure for your left breast, using the pads of the 3 middle fingers of your right hand.  To check your breasts while in the shower:  Place one arm over your head, and lightly soap your breast on that side.  Using the pads of your fingers, gently move your hand over the entire breast area and armpit, feeling carefully for any lumps or changes.  Repeat for the other breast.  Have your doctor check anything you notice to see if you need further testing.  Where can you learn more?  Go to https://www.[employer].net/patientEd and enter P148 to learn more about \"Breast Self-Exam: Care Instructions.\"  Current as of: April 30, 2024  Content Version: 14.4  © 9124-9724 [email or web address].   Care instructions adapted under license by [employer]. If you have questions about a medical condition or this instruction, always ask your healthcare professional. [employer] disclaims any warranty or liability for your use of this information.

## 2025-05-15 NOTE — PROGRESS NOTES
Em Holt is a 29 y.o. female, No obstetric history on file., No LMP recorded (lmp unknown)., who presents today for the following:  Chief Complaint   Patient presents with   • Annual Exam        No Known Allergies    Current Outpatient Medications   Medication Sig Dispense Refill   • fluconazole (DIFLUCAN) 150 MG tablet Take 1 tablet by mouth once for 1 dose 1 tablet 0   • acetaminophen (TYLENOL) 325 MG tablet Take 2 tablets by mouth every 4 hours as needed (Patient not taking: Reported on 5/15/2025)     • ibuprofen (ADVIL;MOTRIN) 600 MG tablet Take 1 tablet by mouth every 6 hours as needed (Patient not taking: Reported on 5/15/2025)     • lidocaine (LIDODERM) 5 % Apply patch to the affected area for 12 hours a day and remove for 12 hours a day. (Patient not taking: Reported on 5/15/2025)     • medroxyPROGESTERone (DEPO-PROVERA) 150 MG/ML injection INJECT 1 ML INTRAMUSCULAR EVERY 3 MONTHS. (Patient not taking: Reported on 5/15/2025)       No current facility-administered medications for this visit.         Past Medical History:   Diagnosis Date   • Amenorrhea 10/26/2020   • Family history of diabetes mellitus 10/26/2020   • Migraines    • Miscarriage 10/26/2020   • Seizure disorder (HCC) 10/26/2020       Past Surgical History:   Procedure Laterality Date   •  SECTION     • TONSILLECTOMY         Family History   Problem Relation Age of Onset   • Diabetes Maternal Grandmother        Social History     Socioeconomic History   • Marital status: Single     Spouse name: Not on file   • Number of children: Not on file   • Years of education: Not on file   • Highest education level: Not on file   Occupational History   • Not on file   Tobacco Use   • Smoking status: Never   • Smokeless tobacco: Never   Substance and Sexual Activity   • Alcohol use: Never   • Drug use: Never   • Sexual activity: Yes     Partners: Male     Birth control/protection: Injection   Other Topics Concern   • Not on file   Social History

## 2025-05-18 ENCOUNTER — HOSPITAL ENCOUNTER (EMERGENCY)
Facility: HOSPITAL | Age: 30
Discharge: HOME | End: 2025-05-18
Attending: FAMILY MEDICINE
Payer: MEDICAID

## 2025-05-18 VITALS
TEMPERATURE: 97.2 F | BODY MASS INDEX: 23.82 KG/M2 | RESPIRATION RATE: 20 BRPM | HEART RATE: 82 BPM | WEIGHT: 139.5 LBS | SYSTOLIC BLOOD PRESSURE: 120 MMHG | OXYGEN SATURATION: 100 % | DIASTOLIC BLOOD PRESSURE: 87 MMHG | HEIGHT: 64 IN

## 2025-05-18 DIAGNOSIS — O26.91 COMPLICATION OF PREGNANCY IN FIRST TRIMESTER: Primary | ICD-10-CM

## 2025-05-18 LAB — HCG UR QL: POSITIVE

## 2025-05-18 PROCEDURE — 99283 EMERGENCY DEPT VISIT LOW MDM: CPT

## 2025-05-18 PROCEDURE — 81025 URINE PREGNANCY TEST: CPT

## 2025-05-18 ASSESSMENT — LIFESTYLE VARIABLES
HOW OFTEN DO YOU HAVE A DRINK CONTAINING ALCOHOL: NEVER
HOW MANY STANDARD DRINKS CONTAINING ALCOHOL DO YOU HAVE ON A TYPICAL DAY: PATIENT DOES NOT DRINK

## 2025-05-18 ASSESSMENT — PAIN - FUNCTIONAL ASSESSMENT
PAIN_FUNCTIONAL_ASSESSMENT: NONE - DENIES PAIN
PAIN_FUNCTIONAL_ASSESSMENT: 0-10
PAIN_FUNCTIONAL_ASSESSMENT: ACTIVITIES ARE NOT PREVENTED

## 2025-05-18 ASSESSMENT — PAIN SCALES - GENERAL: PAINLEVEL_OUTOF10: 0

## 2025-05-18 NOTE — ED TRIAGE NOTES
Patient presents for complaint of positive home pregnancy test.  Requesting pregnancy confirmation.

## 2025-05-21 LAB
., LABCORP: NORMAL
C TRACH RRNA CVX QL NAA+PROBE: NEGATIVE
CYTOLOGIST CVX/VAG CYTO: NORMAL
CYTOLOGY CVX/VAG DOC CYTO: NORMAL
CYTOLOGY CVX/VAG DOC THIN PREP: NORMAL
DX ICD CODE: NORMAL
N GONORRHOEA RRNA CVX QL NAA+PROBE: NEGATIVE
OTHER STN SPEC: NORMAL
SERVICE CMNT-IMP: NORMAL
STAT OF ADQ CVX/VAG CYTO-IMP: NORMAL

## 2025-05-21 NOTE — ED PROVIDER NOTES
[facility] EMERGENCY DEPT  EMERGENCY DEPARTMENT HISTORY AND PHYSICAL EXAM      Date: 2025  Patient Name: Em Holt  MRN: [id number]  Birthdate 1995  Date of evaluation: 2025  Provider: Colt Hou MD   Note Started: 12:21 PM EDT 25    HISTORY OF PRESENT ILLNESS     Chief Complaint   Patient presents with    Pregnancy Test       History Provided By: Patient    HPI: Em Holt is a 29 y.o. female presents to the ED to have a confirmatory pregnancy test she denies any nausea vomiting dysuria or or vaginal bleeding she thinks she is missed 1 menstrual period    PAST MEDICAL HISTORY   Past Medical History:  Past Medical History:   Diagnosis Date    Amenorrhea 10/26/2020    Family history of diabetes mellitus 10/26/2020    Migraines     Miscarriage 10/26/2020    Seizure disorder (HCC) 10/26/2020       Past Surgical History:  Past Surgical History:   Procedure Laterality Date     SECTION      TONSILLECTOMY         Family History:  Family History   Problem Relation Age of Onset    Diabetes Maternal Grandmother        Social History:  Social History     Tobacco Use    Smoking status: Never     Passive exposure: Never    Smokeless tobacco: Never   Vaping Use    Vaping status: Never Used   Substance Use Topics    Alcohol use: Never    Drug use: Never       Allergies:  No Known Allergies    PCP: Audrey Aguiar APRN - NP    Current Meds:   No current facility-administered medications for this encounter.     Current Outpatient Medications   Medication Sig Dispense Refill    acetaminophen (TYLENOL) 325 MG tablet Take 2 tablets by mouth every 4 hours as needed (Patient not taking: Reported on 5/15/2025)      ibuprofen (ADVIL;MOTRIN) 600 MG tablet Take 1 tablet by mouth every 6 hours as needed (Patient not taking: Reported on 5/15/2025)      lidocaine (LIDODERM) 5 % Apply patch to the affected area for 12 hours a day and remove for 12 hours a day. (Patient not taking: Reported on 5/15/2025)

## 2025-06-26 ENCOUNTER — HOSPITAL ENCOUNTER (EMERGENCY)
Facility: HOSPITAL | Age: 30
Discharge: HOME | End: 2025-06-26
Attending: EMERGENCY MEDICINE
Payer: MEDICAID

## 2025-06-26 VITALS
SYSTOLIC BLOOD PRESSURE: 136 MMHG | OXYGEN SATURATION: 98 % | RESPIRATION RATE: 20 BRPM | TEMPERATURE: 98.5 F | HEART RATE: 96 BPM | DIASTOLIC BLOOD PRESSURE: 78 MMHG

## 2025-06-26 DIAGNOSIS — U07.1 COVID: Primary | ICD-10-CM

## 2025-06-26 PROCEDURE — 87636 SARSCOV2 & INF A&B AMP PRB: CPT

## 2025-06-26 PROCEDURE — 99283 EMERGENCY DEPT VISIT LOW MDM: CPT

## 2025-06-26 PROCEDURE — 6370000000 HC RX 637 (ALT 250 FOR IP): Performed by: EMERGENCY MEDICINE

## 2025-06-26 RX ORDER — ONDANSETRON 4 MG/1
4 TABLET, FILM COATED ORAL 3 TIMES DAILY PRN
Qty: 15 TABLET | Refills: 0 | Status: SHIPPED | OUTPATIENT
Start: 2025-06-26

## 2025-06-26 RX ORDER — ACETAMINOPHEN 500 MG
1000 TABLET ORAL
Status: COMPLETED | OUTPATIENT
Start: 2025-06-26 | End: 2025-06-26

## 2025-06-26 RX ORDER — GUAIFENESIN 600 MG/1
600 TABLET, EXTENDED RELEASE ORAL 2 TIMES DAILY
Qty: 30 TABLET | Refills: 0 | Status: SHIPPED | OUTPATIENT
Start: 2025-06-26 | End: 2025-07-11

## 2025-06-26 RX ORDER — ACETAMINOPHEN 500 MG
1000 TABLET ORAL 3 TIMES DAILY PRN
Qty: 60 TABLET | Refills: 0 | Status: SHIPPED | OUTPATIENT
Start: 2025-06-26

## 2025-06-26 RX ORDER — GUAIFENESIN 600 MG/1
600 TABLET, EXTENDED RELEASE ORAL
Status: COMPLETED | OUTPATIENT
Start: 2025-06-26 | End: 2025-06-26

## 2025-06-26 RX ORDER — IBUPROFEN 800 MG/1
800 TABLET, FILM COATED ORAL
Status: COMPLETED | OUTPATIENT
Start: 2025-06-26 | End: 2025-06-26

## 2025-06-26 RX ADMIN — GUAIFENESIN 600 MG: 600 TABLET, EXTENDED RELEASE ORAL at 14:51

## 2025-06-26 RX ADMIN — IBUPROFEN 800 MG: 800 TABLET, FILM COATED ORAL at 14:51

## 2025-06-26 RX ADMIN — ACETAMINOPHEN 1000 MG: 500 TABLET ORAL at 14:51

## 2025-06-26 ASSESSMENT — PAIN - FUNCTIONAL ASSESSMENT
PAIN_FUNCTIONAL_ASSESSMENT: 0-10
PAIN_FUNCTIONAL_ASSESSMENT: PREVENTS OR INTERFERES SOME ACTIVE ACTIVITIES AND ADLS

## 2025-06-26 ASSESSMENT — LIFESTYLE VARIABLES
HOW OFTEN DO YOU HAVE A DRINK CONTAINING ALCOHOL: PATIENT DECLINED
HOW MANY STANDARD DRINKS CONTAINING ALCOHOL DO YOU HAVE ON A TYPICAL DAY: PATIENT DECLINED

## 2025-06-26 ASSESSMENT — PAIN SCALES - GENERAL: PAINLEVEL_OUTOF10: 7

## 2025-06-26 ASSESSMENT — PAIN DESCRIPTION - DESCRIPTORS: DESCRIPTORS: ACHING

## 2025-06-26 ASSESSMENT — PAIN DESCRIPTION - PAIN TYPE: TYPE: ACUTE PAIN

## 2025-06-26 ASSESSMENT — PAIN DESCRIPTION - LOCATION: LOCATION: GENERALIZED

## 2025-06-26 NOTE — DISCHARGE INSTRUCTIONS
Thank you for choosing our Emergency Department for your care.  It is our privilege to care for you in your time of need.  In the next several days, you may receive a survey via email or mailed to your home about your experience with our team.  We would greatly appreciate you taking a few minutes to complete the survey, as we use this information to learn what we have done well and what we could be doing better. Thank you for trusting us with your care!    Below you will find a list of your tests from today's visit.   Labs and Radiology Studies  Recent Results (from the past 12 hours)   COVID-19 & Influenza Combo    Collection Time: 06/26/25  1:41 PM    Specimen: Nasopharyngeal   Result Value Ref Range    SARS-CoV-2, PCR DETECTED (A) Not Detected      Rapid Influenza A By PCR Not Detected Not Detected      Rapid Influenza B By PCR Not Detected Not Detected       No results found.  ------------------------------------------------------------------------------------------------------------  The evaluation and treatment you received in the Emergency Department were for an urgent problem. It is important that you follow-up with a doctor, nurse practitioner, or physician assistant to:  (1) confirm your diagnosis,  (2) re-evaluation of changes in your illness and treatment, and (3) for ongoing care. Please take your discharge instructions with you when you go to your follow-up appointment.     If you have any problem arranging a follow-up appointment, contact us!  If your symptoms become worse or you do not improve as expected, please return to us. We are available 24 hours a day.     If a prescription has been provided, please fill it as soon as possible to prevent a delay in treatment. If you have any questions or reservations about taking the medication due to side effects or interactions with other medications, please call your primary care provider or contact us directly.  Again, THANK YOU for choosing us to care  for YOU!

## 2025-06-26 NOTE — ED PROVIDER NOTES
[facility] EMERGENCY DEPT  EMERGENCY DEPARTMENT HISTORY AND PHYSICAL EXAM      Date of evaluation: 2025  Patient Name: Em Holt  Birthdate 1995  MRN: [id number]  ED Provider: Bebe Mac MD   Note Started: 2:45 PM EDT 25    HISTORY OF PRESENT ILLNESS     Chief Complaint   Patient presents with    Flu like symptoms       History Provided By: Patient, {Historian:[id number]}     HPI: Em Holt is a 29 y.o. female presents with 3-4 days of malaise, fatigue, congestion, headache.    PAST MEDICAL HISTORY   Past Medical History:  Past Medical History:   Diagnosis Date    Amenorrhea 10/26/2020    Family history of diabetes mellitus 10/26/2020    Migraines     Miscarriage 10/26/2020    Seizure disorder (HCC) 10/26/2020       Past Surgical History:  Past Surgical History:   Procedure Laterality Date     SECTION      TONSILLECTOMY         Family History:  Family History   Problem Relation Age of Onset    Diabetes Maternal Grandmother        Social History:  Social History     Tobacco Use    Smoking status: Never     Passive exposure: Never    Smokeless tobacco: Never   Vaping Use    Vaping status: Never Used   Substance Use Topics    Alcohol use: Never    Drug use: Never       Allergies:  No Known Allergies    PCP: Audrey Aguiar APRN - NP    Current Meds:   No current facility-administered medications for this encounter.     Current Outpatient Medications   Medication Sig Dispense Refill    acetaminophen (TYLENOL) 325 MG tablet Take 2 tablets by mouth every 4 hours as needed (Patient not taking: Reported on 5/15/2025)      ibuprofen (ADVIL;MOTRIN) 600 MG tablet Take 1 tablet by mouth every 6 hours as needed (Patient not taking: Reported on 5/15/2025)      lidocaine (LIDODERM) 5 % Apply patch to the affected area for 12 hours a day and remove for 12 hours a day. (Patient not taking: Reported on 5/15/2025)      medroxyPROGESTERone (DEPO-PROVERA) 150 MG/ML injection INJECT 1 ML INTRAMUSCULAR EVERY 3  List as of 6/26/2025  2:52 PM          I am the Primary Clinician of Record. Bebe Mac MD (electronically signed)    (Please note that parts of this dictation were completed with voice recognition software. Quite often unanticipated grammatical, syntax, homophones, and other interpretive errors are inadvertently transcribed by the computer software. Please disregards these errors. Please excuse any errors that have escaped final proofreading.)     Bebe Mac MD  06/30/25 0902

## 2025-06-26 NOTE — ED TRIAGE NOTES
Pt reports flu like symptoms x 2 days. Pt well appearing, cough and nasal congestion noted. PT drinking PO fluids in triage